# Patient Record
(demographics unavailable — no encounter records)

---

## 2024-11-04 NOTE — HISTORY OF PRESENT ILLNESS
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 Has been having back pain for the last 2-3 weeks. D/c Wellbutrin because her leg was peeling. Asking for HHA extra hours

## 2024-11-04 NOTE — ASSESSMENT
[FreeTextEntry1] :  30 minutes were spent discussing concerns/medical conditions excluding procedure time.

## 2024-11-04 NOTE — REVIEW OF SYSTEMS
[Shortness Of Breath] : no shortness of breath [Wheezing] : no wheezing [Cough] : no cough [Dyspnea on Exertion] : dyspnea on exertion [Negative] : Cardiovascular

## 2024-11-14 NOTE — HISTORY OF PRESENT ILLNESS
[FreeTextEntry1] :    The patient is a  70year P3 with complaints of luci  She reoirts  any urinary leakage She feels complete bladder emptying She voids every _15____ volume. 3nocturia.  Her liquid intake consists of She has a BM q She denies gross hematuria, hx of nephrolithiasis, vaginal bulge or recurrent UTIs Fecal Inc occ Sexually active n Denies abdominal surgical history baiatric sugery on ozempic She has no history of bruising, excessive bleeding, peteciae. reports  ? pmvb drinks water and  diet coke daily  muliple

## 2024-11-14 NOTE — DISCUSSION/SUMMARY
[Reviewed Clinical Lab Test(s)] : Results of clinical tests were reviewed. [Discuss Alternatives/Risks/Benefits w/Patient] : All alternatives, risks, and benefits were discussed with the patient/family and all questions were answered.  Patient expressed good understanding and appreciates the importance of follow up as recommended. [Visit Time ___ Minutes] : [unfilled] minutes [Face to Face Time___ Minutes] : with [unfilled] minutes in face to face consultation. [FreeTextEntry1] : follow up urd and sonogram

## 2024-11-14 NOTE — ASSESSMENT
[FreeTextEntry1] : 70p3  uui  wearing pullups We discussed possible etiologies of her symptoms including overactive bladder. I recommend she start behavioral modifications and bladder training. Written instructions on how to perform bladder training were provided.  She will try this for 4-6 weeks. We reviewed medications for overactive bladder.  If she has no significant improvement in her symptoms she will try adding an anticholinergic medication. Risks and side effects reviewed extensively. She will RTO in 10-12 weeks for follow up or sooner if issues arise.  If she has no improvement in symptoms, will proceed with further workup including urodynamic testing and cystoscopy.  rec  urd   PMVB- unclear etiology check  sonogram pelvic and renal bladder

## 2024-11-14 NOTE — OB HISTORY
[Total Preg ___] : : [unfilled] [Full Term ___] : [unfilled] (full-term) [Living ___] : [unfilled] (living) [Vaginal ___] : [unfilled] vaginal delivery(s) [Abnormal Bleeding] : abnormal bleeding [Sexually Active] : is not sexually active [FreeTextEntry1] : son  tat age 24 from colonacancer

## 2024-11-14 NOTE — PROCEDURE
[Straight Catheterization] : insertion of a straight catheter [Urgent Incontinence] : urgent incontinence [Patient] : the patient [Intraurethral 2% Lidocaine Gel ___ (cc)] : Local Anesthesia: [unfilled] cc of 2% Lidocaine Gel was administered intraurethrally  [None] : there were no complications with the catheter insertion [Clear] : clear [Culture] : culture [No Complications] : no complications [Tolerated Well] : the patient tolerated the procedure well [Post procedure instructions and information given] : Post procedure instructions and information were given and reviewed with patient.

## 2024-11-14 NOTE — PHYSICAL EXAM
[Chaperone Present] : A chaperone was present in the examining room during all aspects of the physical examination [[id number]] : A chaperone was present during the pelvic exam. [FreeTextEntry2] : tiffanie [No Acute Distress] : in no acute distress [Well developed] : well developed [Well Nourished] : ~L well nourished [Good Hygeine] : demonstrates good hygeine [Oriented x3] : oriented to person, place, and time [Normal Memory] : ~T memory was ~L unimpaired [Normal Mood/Affect] : mood and affect are normal [Normal Lung Sounds] : the lungs were clear to auscultation [Respirations regular] : ~T respiratory rate was regular [Rate & Rhythm Regular] : ~T heart rate and rhythm were normal [No Edema] : ~T edema was not present [Supple] : ~T the neck demonstrated no ~M decrease in suppleness [Thyroid Normal] : the thyroid ~T showed no abnormalities [Symmetrical] : the neck was ~L symmetrical [Mass (___ Cm)] : no ~M [unfilled] abdominal mass was palpated [Tenderness] : ~T no ~M abdominal tenderness observed [H/Smegaly] : no hepatosplenomegaly [Warm and Dry] : was warm and dry to touch [Turgor Normal] : skin turgor ~T was normal [Rash/Lesion] : no rash or lesion was noted [Normal Gait] : gait was abnormal [No Joint Swelling] : there was swelling of the joints [No Clubbing, Cyanosis] : no clubbing or cyanosis of the fingernails [Normal Strength/Tone] : muscle strength and tone was not normal [Normal Appearance] : general appearance was normal [Uterine Adnexae] : were not tender and not enlarged [Post Void Residual ____ml] : post void residual was [unfilled] ml [Normal] : was normal [None] : no [de-identified] : uses walker

## 2024-11-14 NOTE — REASON FOR VISIT
[Initial Visit ___] : an initial visit for [unfilled] [Urinary Incontinence] : urinary incontinence [Urine Frequency] : urine frequency [FreeTextEntry2] :        /pmvb v hematuia

## 2025-01-13 NOTE — HISTORY OF PRESENT ILLNESS
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 Today c/o dental pain and swelling of her right cheek for 2 days, pt has poor dentition and has been unable to schedule appt with dentist. Also refer SOB on exertion, feels like her usual asthma exacerbation

## 2025-01-13 NOTE — PHYSICAL EXAM
[No Respiratory Distress] : no respiratory distress  [No Accessory Muscle Use] : no accessory muscle use [Normal] : normal rate, regular rhythm, normal S1 and S2 and no murmur heard [de-identified] : b/l wheezing

## 2025-01-13 NOTE — HEALTH RISK ASSESSMENT
[Little interest or pleasure doing things] : 1) Little interest or pleasure doing things [Feeling down, depressed, or hopeless] : 2) Feeling down, depressed, or hopeless [1] : 2) Feeling down, depressed, or hopeless for several days (1) [PHQ-2 Positive] : PHQ-2 Positive [de-identified] : I spend 5 min performing a depression screening on this patient [[id number]] : 2 [Former] : Former

## 2025-01-13 NOTE — REVIEW OF SYSTEMS
[Shortness Of Breath] : shortness of breath [Wheezing] : wheezing [Cough] : no cough [Dyspnea on Exertion] : dyspnea on exertion [Negative] : Cardiovascular

## 2025-01-23 NOTE — REASON FOR VISIT
[Follow-Up] : a follow-up visit [Asthma] : asthma [COPD] : COPD [Pulmonary Nodules] : pulmonary nodules

## 2025-01-28 NOTE — ASSESSMENT
[FreeTextEntry1] : 1-28-25  It was a pleasure to see Karyna during our follow-up visit today. Her respiratory issues are summarized:  1. Asthma/COPD overlap (ACO) Over 40 years of age, demonstrates moderate-severe airflow obstruction with FEV1/FVC of 50% of predicted. She has had asthma since 5 years old. With inhaled bronchodilator there is partial reversal of airflow obstruction 0.86 L -> 1.05 L, 41% -> 51%. She has been exposed to cigarette smoke. She has a 44 PY history of smoking. We have  maintained on Singulair, Incruse,  Brovana and budesonide. She continues to take her rescue inhaler sporadically. We will continue to monitor FEV1 on an annual basis to ensure no downward trend. She has abstained from cigarettes for over 10 years. She continues to use marijuana.   PFTs today (1/28/25) shows mild obstructive lung defect.  Over the past year, there is significant improvement in small airways, [id number]%, 0.20 -> 0.47 There is less air trapping, RV/TLC 64% -> 46% DLCO is 10.55 (54% of predicted). This is stable. Her walk distance improved by > 40 meters and saturation is significantly better. She walked 310 meters, SpO2  -> 95%.  She has lost over approximately 130 pounds, which is the most likely reason for improvement in PFTs and 6MWT  Recently, there have been several studies done showing that people who have an allergic asthma component of their asthma-COPD syndrome seem to have better quality of life and less exacerbations if they are put on biologic. ALENO study: using mepolizumab in a dose of 300mg subq every 4 weeks showed significant improvement in aforementioned endpoints. Two other studies FLAKOUS and NOTUS using dupilumab also showed improvements. We will perform testing for TH2 Asthma phenotype to determine if she is a suitable candidate.   Plan: - continue with triple inhaler therapy (Incruse (1 puff daily), Symbicort (advised to take 2 puffs BID), albuterol PRN) - obtain IgE, allergy panel, CBC with differential for eosinophil count.   2. Marijuana smoking We have gone over with Karyna the objective evidence of adverse effects of marijuana 1 higher mortality according to [place] study (MVA, CV, pulmonary Side Effects) 2, cough, wheeze and dyspnea 3. exacerbation of asthma 4. inconsistent results but possible cancer. Cancers: squamous cell of head and neck. In men testicular cancer 5. cardiac toxicity (ischemia, MI, arrhythmias) 6. Strokes 7. Cognitive dysfunction.   She continues to smoke marijuana (non-prescription).  3. Pulmonary nodules There were micro nodules seen on 8/23/21. Chest CT scan done 8/17/2022 demonstrates very tiny micro nodules none of which are new.  Plan: She is overdue for LCS LDCT Shared decision making done today  4. ELLA - she has lost over 120 lbs. We will obtain repeat sleep study off CPAP to determine if she still requires it.   5. PH  It is unlikely that she has pulmonary hypertension. On her last echo 2/2024 there was insufficient TR to calculate PASP  6. Chronic pain  Karyna's main physical complaint is her chronic pain. She has a referral to see a new pain specialist and also plans to start seeing a psychotherapist. She is not a candidate for surgery. She uses gabapentin.   Plan: - She should continue to follow closely with pain management specialist.   7. Lower extremity edema with elevated d-dimer Karyna has R > L lower extremity edema with venous stasis changes of the right leg. Her d-dimer was elevated to 364 on 8/16. Bilateral lower extremity venous doppler to r/o DVT was negative on 8/24  Return to clinic in 6 months.

## 2025-01-28 NOTE — ADDENDUM
[FreeTextEntry1] :  I, Dr. Damaso Mcmullen, personally performed the evaluation and management (E/M) services for this established patient who presents today with no new symptoms     I personally performed the services described in the documentation, reviewed the documentation recorded by the scribe in my presence and it accurately and completely records my words and actions      Ms. Korin Almazan acted as a scribe in writing the note that was dictated by me.     I spent  a total of 40 min minutes evaluating the patient today. This includes spending 10 min on reviewing the patient's clinical history, serial CT scans and PFT's before the patients visit     The remaining time was spent with the patient in showing prior CT scan and comparing them with the current images.     This time does not include performance of any procedures such as PFT

## 2025-01-28 NOTE — DISCUSSION/SUMMARY
[FreeTextEntry1] : ATTENDING'S SUMMARY: 1-28-25 No pallor, no icterus.  No cervical adenopathy, no supraclavicular adenopathy. No JVD in the sitting position No cyanosis, no clubbing, no articular manifestations, no Raynaud's, good radial pulses, no thickening on dorsum of skin, no subcutaneous nodules.  High pitched pan expiratory wheezing noted bilaterally on exhalation.  Cardiovascular sounds distant, normal S1, S2, no murmurs, rubs or gallops.  1+ pitting edema, equal bilaterally.

## 2025-01-28 NOTE — PROCEDURE
[Thoracic Ultrasound] : Thoracic Ultrasound [A line] : A line: Yes [Lung sliding] : Lung sliding: Yes [Pleural Effusion] : Pleural Effusion: No [Consolidation] : Consolidation: No [de-identified] : abnormal physical exam finding [FreeTextEntry2] : No signs of pulmonary edema or pleural effusions. Normal lung POCUS examination. [FreeTextEntry1] : PFT 25 FVC: 1.83 L (71%)--> 1.86 L (72%)  FEV1: 1.14 L (57%)--> 1.14 L (57%)  FEV1/FVC: 62%--> 62% LMD22-08%: 0.47 L/s (26%)--> 0.40 L/s (22%) TLC: 3.38 L (76%) RV/T% DLCO: 10.55 (54%) NIOX 6 ppb  6MWT 25: 310 meters, SpO2 97% -> 95%  PFT 24  FVC: 1.56 L (65%)--> 1.79 L (75%)   FEV1: 0.87 L (46%)--> 1.03 L (55%)   FEV1/FVC: 56%--> 58% TRC33-52%: 0.20 L/s (12%)--> 0.25 L/s (15%) T.73 L (113%) RV/T% DLCO: 14.87 (91%)  6MWT 24: 257 METERS, SPo2 94% -> 90%  Patient had an annual Low Dose CT for Lung Cancer screening on 23 Results of scan: Impression: Since 2022, there are again a few micronodules bilaterally. Lung-RADS category: 2  22 LDCT CHEST Report pending Images reviewed by Dr. Mcmullen personally Mild dilatation of airways with thickening of the walls discoid atelectasis on lingular segment significant Coronary Artery calcification No visible nodules. Previous nodules in RUL not seen No lung parenchymal abnormalities ***** 22 ECHO Normal LV and RV size and funtion No significant valvular disease insufficient TR to calculate PASP ***** PFT 22 FVC: 1.48 L (54%)--> 2.17 L (81%)   FEV1: 0.86 L (41%)--> 1.05 L (51%)   FEV1/FVC: 59%--> 48% GOV84-66%: 0.29 L/s (15%)--> 0.25 L/s (13%) TLC: 3.61 L (81%) RV/T% DLCO: 9.97 (50%)  EX[facility]: 2020  Lungs and airways: Image numbers for description of nodule location refer to thin section axial series number 4.  The previously seen nodular opacity in the right lower lobe has resolved, indicating that it was probably caused by atelectasis. There is a small amount of dependent atelectasis in the left lower lobe. No change in a few bilateral micronodules, such as in the apical segment of the right upper lobe on image 63, in the anterior segment of the right upper lobe on image 94, and in the superior segment of the left lower lobe on image 156. A benign-appearing calcified nodule nodule in the anterobasal segment of the right lower lobe on image 176 unchanged. There is again mild centrilobular emphysema. Bronchial wall thickening again present bilaterally.  Pleura: The pleural spaces are clear.  Base of neck, mediastinum and heart: No change 0.6 cm calcified nodule right lobe of thyroid. No mediastinal, hilar or axillary lymphadenopathy is seen.  The heart and pericardium are within normal limits.  Vessels/Coronary artery calcification: Small calcified plaque aorta. Moderate coronary artery calcification.  Soft tissues: Left chest wall implant has pacemaker leads within right atrium and right ventricle.  Abdomen: Small hiatal hernia. Again post sleeve gastrectomy.  Bones: Degenerative changes of spine.  Impression: Since 3/5/2019, there has been no change in a few micronodules bilaterally. Probable atelectasis in the right lower lobe has resolved.  Lung-RADS category: 2 - Benign appearance or behavior. Nodules with very low likelihood of becoming a clinically active cancer due to size or lack of growth. Probability of malignancy < 1%.  Recommendation: Continue annual screening with LDCT in 12 months.   PFT and 6MWT 19: FVC: 1.54 L (59%) --> 1.62 L (62%) FEV1: 0.94 L (45%) --> 1.06 L/s (51%) FEV1/FVC: 61% --> 66% EJA60-17%: 0.40 L/s (18%) --> 0.51 L/s (23%) TLC: 3.45 L (81%) RV/T% DLCO: 15.3 (65%) 6MWT: 366 meters, SpO2 start (on RA): 94% --> SpO2 end (on RA): 94% Moderate obstructive lung defect. FEV1 increased by 13% post-inhaled bronchodilator. CUR47-64% increased by 27% post-inhaled bronchodilator. MIld restrictive lung defect. Mildly decreased diffusion capacity. Improved walk distance with no desaturation.   Spirometry and NIOX 19: pt declined  EXAM: CT LDCT LUNG CA SHORT TERM F U PROCEDURE DATE: 2019  Lungs and airways: Image numbers for description of nodule location refer to thin section axial series number 4.  The nodular opacity in the posterior basal segment of the right lower lobe on image 265 has decreased in size from 19 x 16 mm (13 mm average) to 13 x 9 mm (11 mm average). No change solid micronodule right apex, image 106. No new lung nodule.  Pleura: The pleural spaces are clear.  Base of neck, mediastinum and heart: The thyroid gland is normal. No mediastinal, hilar or axillary lymphadenopathy is seen. The heart is enlarged. No pericardial effusion. Small calcified plaque aorta.  Coronary artery calcification: Moderate.  Soft tissues: There is a left chest wall implant with pacemaker leads in the right atrium and right ventricle.  Abdomen: Again post sleeve gastrectomy. Small hiatal hernia.  Bones: Degenerative changes of spine.  Impression: Since 2018, the nodular opacity in the right lower lobe has decreased in size. It could be an area of atelectasis.  Lung-RADS category: 2 - Benign appearance or behavior. Nodules with very low likelihood of becoming a clinically active cancer due to size or lack of growth. Probability of malignancy < 1%.  Recommendation: Continue annual screening with LDCT in 12 months.   PFT 19: Pt declined PFT testing today.  EXAM: CT LDCT LUNG CA SCRN ANNUAL PROCEDURE DATE: 2018  INTERPRETATION: CT scan of the chest without intravenous contrast, using low-dose lung cancer screening protocol  History: 64-year-old former smoker with a 44 pack year history of smoking.  Comparison: 2017.  Findings:  Lungs and airways: Image numbers for description of nodule location refer to thin section axial series number 4. There is new focus of tree-in-bud micronodular opacities seen within the posterior segment of the right upper lobe (series 4, image 111). There has been interval resolution of linear atelectasis within the medial basal segment of the right middle lobe. Interval resolution of linear atelectasis within the right lower lobe. New tree-in-bud micronodular opacities are seen within the posterior basal segment of the right lower lobe (series 4, image 187 Linear atelectasis over the medial basal segment of the right lower lobe (image 4, 189.   NODULE 1:  Location: Posterior basal segment of the right lower lobe, Image #(axial image 209, sagittal image 64, and coronal image 51)  Size: 12.5 mm  Contains Fat: No  Calcification: None  Morphology: Round  Margins: Smooth  Change from prior: New   Trachea and central bronchi patent.  Pleura: New probable nodular pleural thickening versus atelectasis as described above involving the medial basal segments of right lower lobe (axial image 189 and sagittal image 77).  Base of neck, mediastinum and heart: The thyroid gland is normal. No mediastinal, hilar or axillary lymphadenopathy is seen. There is cardiomegaly. Pericardium are within normal limits. There is a left-sided  permanent pacemaker. The pacer wires are again noted in the region of the right atrium and right ventricle.  Coronary artery calcification: Mild  Soft tissues: Normal.  Abdomen: There is streak artifact however grossly unchanged and unremarkable unenhanced appearance. Probable left-sided parapelvic cysts sleeve gastrectomy.  Bones: Severe thoracolumbar scoliosis.   Impression:  1. Exacerbation in small airway disease with new tree-in-bud centrilobular micronodules within the right upper and right lower lobes.  2. New nodular pleural thickening over the medial basal segment of the right lower lobe.  3. In addition there is a new nodular opacity which may represent an area of rounded atelectasis versus a new nodule measuring 12.5 mm involving the posterior basal segment the right lower lobe.   Lung-RADS category:   4B - Suspicious findings for which additional diagnostic testing and/or  tissue sampling is recommended. Probability of malignancy > 15%.   Recommendation:  4B - chest CT with or without contrast, PET/CT and/or tissue sampling  depending on the probability of malignancy and comorbidities. PET/CT may be  used when there is a = 8 mm solid component.   [place] and 6MWT 18: FVC: 1.66 L (69%) --> 1.75 L (72%) FEV1: 1.18 L (60%) --> 1.17 L (60%) FEV1/FVC: 71% --> 67% SAX45-70%: 0.68 L/s (31%) --> 0.68 L/s (31%) 6MWT: 220 meters, SpO2 start: 94% --> SpO2 end: 94% Cannot r/o mild restrictive defect.  No significant expiratory airflow obstruction. No bronchodilator response. Reduced walk distance with no desaturation;  pt feels she may have been able to walk faster during the test.  [place] and DLCO 18: FVC: 1.44 L (51%) --> 1.74 L (62%) FEV1: 1.00 L (46%) --> 1.13 L (52%) FEV1/FVC: 70% --> 65% JHW82-42%: 0.60 L/s (28%) --> 0.48 L/s (22%) DLCO: 9.9 (41%) Severe obstruction with significant response to inhaled bronchodilator (in FVC). Moderatley decreased diffusion capacity.   [place] and DLCO, 6MWT 18: FVC: 1.97 L (70%) --> 2.03 L (73%) FEV1: 1.30 L (60%) --> 1.43 L (65%) FEV1/FVC: 66% --> 70% EVA13-91%: 0.63 L/s (29%) --> 0.80 L/s (37%) DLCO: 17.2 (72%) 6MWT: 288m, SpO2 start: 94%, SpO2 end: 95% Mildly reduced FVC. Mild obstructive defect. Mildly reduced diffusion capacity.  Reduced walk distance. No desaturation.  BAL 17: AFB culture: No AFB at 6 weeks Fungal culture: No fungus at 4 weeks C&S: Normal respiratory denise Fungitell: <45 Galactomannan: <0.500 Fluid segmented granulocytes: 94% Lymphocytes: 1% Monocytes: 4% Eosinophil: 1%  Spirometry 17: FVC: 1.67 L (60%) --> 1.63 L (58%) FEV1: 1.03 L (47%) --> 1.05 L (48%) FEV1/FVC: 62% --> 64% DGX86-37%: 0.41 L/s (19%) --> 0.41 L/s (19%) Severe obstruction with no significant response to inhaled bronchodilator.   NIOX 17: 16 ppb Normal exhaled nitric oxide level.   EXAM:  CT NECK SOFT TISSUE, 2017 COMPARISON: CT angiogram of the neck 2013. FINDINGS: The aerodigestive tract is patent, but suboptimally assessed with intravenous contrast. There is no masslike asymmetry or fluid collection. At the level of the thoracic inlet just below the thyroid gland there is a slight contour deformity of the right posterior lateral tracheal wall. There is expected flattening and slight inward convexity of the posterior tracheal wall during this scan done at quiet respiration. Volume rendered 3D images are generated by the attending radiologist using a separate workstation.  There is no significant luminal compromise. The included lung apices appear grossly clear, however please see dedicated report from the contemporaneous chest CT for complete evaluation. A left chest wall cardiac pacer device noted. Review of the [place] stations demonstrates scattered subcentimeter nodes which do not meet imaging criteria for pathologic lymphadenopathy. Surgical clips identified from partial left superficial parotidectomy. The right parotid gland and both submandibular glands appear unremarkable on this noncontrast study. The osseous structures show cervical spondylosis at C4-5 level. The visualized intracranial and intraorbital compartments fail to demonstrate an acute abnormality. There is a large nasal septal defect. IMPRESSION: CT neck shows a patent airway. There is minimal contour deformity of the right posterolateral upper trachea. This is present on this exam done under quiet respiration. For more complete evaluation of the chest, please see dedicated report from the concurrent chest CT.  EXAM:  CT LDCT LUNG CA SCRN BASELINE *** ADDENDUM 2017  *** Addendum: 1. 4 mm noncalcified nodule within the lateral aspect of the apical segment of the right upper lobe (image 72) on further inspection was present on examination dated 10/17/2016, and 2016 and essentially unchanged allowing for differences in imaging technique. 2. Previously described 8 mm nodule within the posterior basal segment of the left lower lobe on multiplanar reformations has a more linear configuration consistent with a new focus of probable atelectasis as demonstrated in the posterior basal segment of the right lower lobe (image 259, and coronal image 125, series [id number], and sagittal image 17, series [id number]). Revised Lung-Rads Category: 2  Recommendation: Continue annual screening with LDCT in 12 months. *** END OF ADDENDUM 2017  ***  PROCEDURE DATE:  2017 INTERPRETATION:  CT scan of the chest without intravenous contrast, using low-dose lung cancer screening protocol History: 44 year smoking history. Quit 6 years ago. Lung cancer screening. Comparison: CT chest from 10/17/2016. Findings: Lungs and airways: (Series 4) Minimal biapical paraseptal and mild upper lung zone centrilobular emphysema. There is increasing mild small airway inflammation characterized by scattered micronodules in a centrilobular tree-in-bud pattern (image 113, and 221) in the right upper, and right lower lobes respectively. There is increasing mild large airway inflammation characterized by bronchial wall thickening and bronchiectasis (images 94, 128 and 185). There are multiple new more discrete solid micronodules identified bilaterally thought to represent areas of mucous plugging largest in the apical segment of the right upper lobe measuring 4 mm (image 72) and in the posterior basal segment of the left lower lobe there is a new subpleural nodular and linear opacity measuring 8 mm (image 59) There are new linear opacities identified bilaterally in the posterior basal segment of the right lower lobe (image 245) as well as in the inferior medial aspect of the medial segment of the right middle lobe (image 214) thought to represent new areas of atelectasis Impression: 1.  Lung Rad category 4A:  New 7 mm subpleural nodule in posterior basal segment of left lower lobe with an adjacent linear component. This may represent a new area of atelectasis. Continue annual screening with low dose contrast CT in 12 months. 2.  Lung Rad category 3:  New 4 mm solid nodule within the apical segment of the right upper lobe. 3.  New mild small and large airway inflammation as described above. 4.  Minimal paraseptal and mild upper lung zone centrilobular emphysema. 5.  Mild left-sided coronary artery calcification.  Lung-RADS category: 3 - Probably benign finding - short term follow up suggested; includes nodules with a low likelihood of becoming a clinically active cancer. Probability of malignancy 1-2%.  4A - Suspicious findings for which additional diagnostic testing and/or tissue sampling is recommended. Probability of malignancy 5-15%.  Recommendation: 3 - 6 month LDCT. 4A - 3 month LDCT; PET/CT may be used when there is a = 8 mm solid component  NIOX 10/24/17: 10ppb Normal exhaled nitric oxide level.  Spirometry 10/24/17: FVC: 1.56 L (56%) FEV1: 1.04 L (48%) FEV1/FVC: 66% KPD14-90%: 0.55 L/s (26%) Moderate airway obstruction.  Cannot rule out restriction.  CXR 10/24/17: No obvious parenchymal lung opacities. No pleural effusion.  Maximal height of right hemidiaphragm located at 1/3 distance from lateral edge.   Spirometry and DLCO 17: FVC: 1.62L (57%) FEV1: 0.89L (40%) FEV1/FVC: 55% PYX37-30%: 0.31L/s (14%) DLCO: 11.1 (48%) Moderate obstructive airways disease. Moderately reduced DLCO.   6MWT 17:  Distance: 288 m with walker, SpO2 start: 95%, SpO2 end: 88% Reduced walk distance with significant desaturation down to SpO2 88%.   Spirometry and DLCO 17: FVC: 1.55 L (55% pred) FEV1: 0.88 L (40% pred) FEV1/FVC: 57% SYM43-18%: 0.32 L/s (14% pred) DLCO: 11.7 (51% pred) Moderate obstructive airways disease. Moderately reduced DLCO.   PFT 3/28/17: FVC: 1.68 L (59% pred) --> 1.70 L (60% pred) FEV1: 1.08 L (49% pred) --> 1.11 L (50% pred) FEV1/FVC: 64% --> 65% UOA57-25%: 0.47 L/s (21% pred) --> 0.49 L/s (22% pred) TLC: 3.46 L (81% pred) RV/T% DLCO: 12.3 (54% pred) Mild obstruction.  Lung volumes normal.  Air trapping present.  Severely reduced DLCO.   17: FVC: 1.61 L (60% pred) FEV1: 1.03 (48% pred) FEV1/FVC: 64% OFJ58-28%: 0.43 L/s (19% pred) Moderate obstructive defect, notably of small airways.  FVC and FEV1 reduced, cannot rule out restriction.    EXAM:  CT CHEST    10/17/2016 Evaluation of the chest demonstrates the visualized thyroid gland is normal in appearance. The heart is normal size. Coronary arterial calcification. No evidence of small airways disease. No evidence of  clustered tree-in-bud branching nodularity. There is a calcified nodule of the right lobe of the thyroid gland measuring 5 mm. No pleural or pericardial effusion. For thoracic inlet, axillary, mediastinal or hilar  adenopathy. Evaluation of the lung parenchyma is negative for pulmonary consolidation or mass. No endobronchial lesion. Minimal biapical centrilobular emphysematous change. Evaluation of the upper abdomen demonstrate a small hiatal hernia and post surgical changes from gastric bypass. Evaluation of the osseous structures is unremarkable. IMPRESSION: No suspicious pulmonary finding.No significant bronchiolitis.  9/15/16 Spirometry: FEV1 0.97L (45%), FVC 1.46L (54%), FEV1/FVC 66% NIOX = 6 CXR - Clear, no infiltrates  6 minute walk test performed 3/1/16: The patient covered a distance of 215 m (significantly improved from last time). The baseline SpO2 was 93%. At the end of exercise it was 91% which is not significantly different. Last while the distance was reduced to approximately 50% of predicted for this lady, there was no desaturation  3/1/16: The forced vital capacity was 1.38 L (48% predicted). The FEV1 was 0.91 L (41% predicted ). The FEV1/FVC was 66%. This shows a very significant reduction as compared to the previous tests.  6MWT 16: Distance: 172m, SpO2 start: 92.7%, SpO2 end: 94% Significantly reduced walk distance.  No significant change in SpO2 with exercise.  PFT 17: FVC: 1.69 L (59% pred) <-- from 1.35 L (47% pred) in 10/15 FEV1: 1.22 L (55% pred) <-- from 0.94 L (42% pred) in 10/15 FEV1/FVC: 72%<-- from 70% in 10/15 QUG38-25%: 0.81 L (36% pred) <-- from 0.61 L/s (27% pred) in 10/15 TLC: 3.40 L (79% pred) DLCO: 14.1 (60% pred) Mixed defect.  Mild improvement in flow after bronchodilator but not c/w positive response. Moderate diffusion capacity decrease.  CT of the CHEST  DATE:  2015 LUNGS: No pleural effusions are seen. There are multiple areas of centrilobular nodules with a branching configuration compatible with a productive bronchiolitis. More linear opacities are noted which may represent areas of mucous plugging within the lateral segment of the right middle lobe as well as the lateral basal segment of the right lower lobe. There is bronchial wall thickening to suggest large airway inflammation. MEDIASTINUM: The heart is normal in size.  No pericardial effusion is seen. Pulmonary arteries and thoracic aorta within normal limits. There is mild to moderate calcification of the left coronary artery. Heart size is within normal limits. No pericardial effusion. No axillary or supraclavicular lymphadenopathy. UPPER ABDOMEN, SOFT TISSUES AND BONES: Limited evaluation of the upper abdomen demonstrates evidence of chain sutures along the greater curvature of the stomach consistent with gastric sleeve procedure. Evaluation of the osseous structures demonstrates degenerative changes. IMPRESSION: Evidence of moderate to severe small airway inflammation characterized by productive bronchiolitis with a lesser involvement of the large airways. This is most pronounced in the distribution of the right middle and right lower lobes. No significant air-trapping is identified. Interval follow-up imaging after therapeutic administration may be of value in approximately 3 months. No suspicious parenchymal mass, lobar consolidation or pleural collections.Consideration, in view of the patient's history of participation in lung cancer screening is suggested.  CPAP polysomnography report from 12 reviewed today -CPAP @ 6.0cm/H2O during sleep with 2L of O2 was suggested  ECHO on 10/12/15 reviewed today -normal right atrium, normal right ventricular size and function, normal tricuspid valve, minimal tricuspid regurgitation -estimated right atrial pressure = 8mm Hg -estimated right ventricular systolic pressure = 24 mm Hg  Spirometry 10/13/15 - c/w severe obstructive pattern with likely small airway involvment FVC: 1.35 (47%) FEV1: 0.94 (42%) FEV1/FVC: 70% DAP19-69: 0.61 (27%)  NiOx 10/13/15 normal= 7  CXR 10/15/15= clear  PS AHI: 51/hr - Lowerst Sat: 75% Patient notes compliance with current CPAP at level 2 and O2 overnight at 2L NC

## 2025-01-28 NOTE — REVIEW OF SYSTEMS
[Fatigue] : fatigue [Recent Wt Gain (___ Lbs)] : ~T recent [unfilled] lb weight gain [Cough] : cough [Dyspnea] : dyspnea [SOB on Exertion] : sob on exertion [Back Pain] : back pain [Chronic Pain] : chronic pain [Depression] : depression [Anxiety] : anxiety [Panic Attacks] : panic attacks [Obesity] : obesity [Negative] : Neurologic [Fever] : no fever [Chills] : no chills

## 2025-01-28 NOTE — PHYSICAL EXAM
[No Acute Distress] : no acute distress [No Resp Distress] : no resp distress [Clear to Auscultation Bilaterally] : clear to auscultation bilaterally [No Abnormalities] : no abnormalities [Benign] : benign [No Clubbing] : no clubbing [No Cyanosis] : no cyanosis [No Edema] : no edema [FROM] : FROM [Normal Color/ Pigmentation] : normal color/ pigmentation [No Focal Deficits] : no focal deficits [Oriented x3] : oriented x3 [Normal Affect] : normal affect [TextBox_2] : No pallor, no icterus [TextBox_11] : no JVD, no hepatojugular reflux  [TextBox_44] : no cervical adenopathy, no supraclavicular adenopathy [TextBox_54] : normal s1/s2, no murmurs, rubs or gallops [TextBox_68] : High pitched pan expiratory wheezing noted bilaterally on exhalation.   [TextBox_105] : 1+ pitting edema, equal bilaterally.

## 2025-01-28 NOTE — HISTORY OF PRESENT ILLNESS
[Former] : former [TextBox_4] : 71 yo F PMH sciatica with 2 herniated discs, 3 brain aneurysms, minimally mobile has a scooter but tries to walk more, presents for F/U of [facility], active marijuana use, GERD, part of the lung cancer screening program, noncaseating granulomas found on subcarinal lymph node without evidence of MTB and CVD serologies negative. Continues on Brovana BID, budesonide BID, Incruse, and Singulair. DLCO noted to have decreased significantly, increased budesonide to BID ************ 1-28-25 She has lost 130 pounds, 278 to 146 pounds she was able to walk 30 blocks the other day she used to have to stop after 3-4 blocks  7-23-24: She was given steroids by primary care doctor 2 weeks ago, about 5 days duration, due to wheezing although she says she felt well from a breathing standpoint wiht no increased phlegm or dyspnea. Says her breathing has been "pretty good". Having daily cough, occasionally with white phlegm. Still losing weight, now 155 (previously 278lbs). Using incruse elipta daily, denies using brovana. Continues to use marijuana daily.   2-20-24: Patient stated that she was placed on prednisone by her new PCP about a week due to wheezing and productive cough. Patient states that her productive cough is chronic and so is her wheezing. She is not sure if the medications were really indicated and if she is improving. Sedentary most of the day. Mentions significant weight loss. Lost 70lbs with ozempic and decreased appetite. Stopped smoking cigarettes 10 years ago. Still smokes marijuana for her joint pain.  5-23-23: She lost 35 pounds, on ozempic She is complaining of worsening asthma symptoms  11-22-22: She is short of breath, worse after she takes a bath She reports asbestos and fungus were found in her bathroom. She is requiring nebulizers 2-3 times a day Phlegm is green She had a stress test on 8/31. When she finished the stress test, she was experiencing chest pain and went to the ER. She was diagnosed with Takotsubo cardiomyopathy. [TextBox_11] : 1 [TextBox_13] : 44 [YearQuit] : 2011

## 2025-01-28 NOTE — PROCEDURE
[Thoracic Ultrasound] : Thoracic Ultrasound [A line] : A line: Yes [Lung sliding] : Lung sliding: Yes [Pleural Effusion] : Pleural Effusion: No [Consolidation] : Consolidation: No [de-identified] : abnormal physical exam finding [FreeTextEntry2] : No signs of pulmonary edema or pleural effusions. Normal lung POCUS examination. [FreeTextEntry1] : PFT 25 FVC: 1.83 L (71%)--> 1.86 L (72%)  FEV1: 1.14 L (57%)--> 1.14 L (57%)  FEV1/FVC: 62%--> 62% RPD11-28%: 0.47 L/s (26%)--> 0.40 L/s (22%) TLC: 3.38 L (76%) RV/T% DLCO: 10.55 (54%) NIOX 6 ppb  6MWT 25: 310 meters, SpO2 97% -> 95%  PFT 24  FVC: 1.56 L (65%)--> 1.79 L (75%)   FEV1: 0.87 L (46%)--> 1.03 L (55%)   FEV1/FVC: 56%--> 58% EAZ32-94%: 0.20 L/s (12%)--> 0.25 L/s (15%) T.73 L (113%) RV/T% DLCO: 14.87 (91%)  6MWT 24: 257 METERS, SPo2 94% -> 90%  Patient had an annual Low Dose CT for Lung Cancer screening on 23 Results of scan: Impression: Since 2022, there are again a few micronodules bilaterally. Lung-RADS category: 2  22 LDCT CHEST Report pending Images reviewed by Dr. Mcmullen personally Mild dilatation of airways with thickening of the walls discoid atelectasis on lingular segment significant Coronary Artery calcification No visible nodules. Previous nodules in RUL not seen No lung parenchymal abnormalities ***** 22 ECHO Normal LV and RV size and funtion No significant valvular disease insufficient TR to calculate PASP ***** PFT 22 FVC: 1.48 L (54%)--> 2.17 L (81%)   FEV1: 0.86 L (41%)--> 1.05 L (51%)   FEV1/FVC: 59%--> 48% TIX27-43%: 0.29 L/s (15%)--> 0.25 L/s (13%) TLC: 3.61 L (81%) RV/T% DLCO: 9.97 (50%)  [facility]: 2020  Lungs and airways: Image numbers for description of nodule location refer to thin section axial series number 4.  The previously seen nodular opacity in the right lower lobe has resolved, indicating that it was probably caused by atelectasis. There is a small amount of dependent atelectasis in the left lower lobe. No change in a few bilateral micronodules, such as in the apical segment of the right upper lobe on image 63, in the anterior segment of the right upper lobe on image 94, and in the superior segment of the left lower lobe on image 156. A benign-appearing calcified nodule nodule in the anterobasal segment of the right lower lobe on image 176 unchanged. There is again mild centrilobular emphysema. Bronchial wall thickening again present bilaterally.  Pleura: The pleural spaces are clear.  Base of neck, mediastinum and heart: No change 0.6 cm calcified nodule right lobe of thyroid. No mediastinal, hilar or axillary lymphadenopathy is seen.  The heart and pericardium are within normal limits.  Vessels/Coronary artery calcification: Small calcified plaque aorta. Moderate coronary artery calcification.  Soft tissues: Left chest wall implant has pacemaker leads within right atrium and right ventricle.  Abdomen: Small hiatal hernia. Again post sleeve gastrectomy.  Bones: Degenerative changes of spine.  Impression: Since 3/5/2019, there has been no change in a few micronodules bilaterally. Probable atelectasis in the right lower lobe has resolved.  Lung-RADS category: 2 - Benign appearance or behavior. Nodules with very low likelihood of becoming a clinically active cancer due to size or lack of growth. Probability of malignancy < 1%.  Recommendation: Continue annual screening with LDCT in 12 months.   PFT and 6MWT 19: FVC: 1.54 L (59%) --> 1.62 L (62%) FEV1: 0.94 L (45%) --> 1.06 L/s (51%) FEV1/FVC: 61% --> 66% FGW07-30%: 0.40 L/s (18%) --> 0.51 L/s (23%) TLC: 3.45 L (81%) RV/T% DLCO: 15.3 (65%) 6MWT: 366 meters, SpO2 start (on RA): 94% --> SpO2 end (on RA): 94% Moderate obstructive lung defect. FEV1 increased by 13% post-inhaled bronchodilator. RND20-15% increased by 27% post-inhaled bronchodilator. MIld restrictive lung defect. Mildly decreased diffusion capacity. Improved walk distance with no desaturation.   Spirometry and NIOX 19: pt declined  EXAM: CT LDCT LUNG CA SHORT TERM F U PROCEDURE DATE: 2019  Lungs and airways: Image numbers for description of nodule location refer to thin section axial series number 4.  The nodular opacity in the posterior basal segment of the right lower lobe on image 265 has decreased in size from 19 x 16 mm (13 mm average) to 13 x 9 mm (11 mm average). No change solid micronodule right apex, image 106. No new lung nodule.  Pleura: The pleural spaces are clear.  Base of neck, mediastinum and heart: The thyroid gland is normal. No mediastinal, hilar or axillary lymphadenopathy is seen. The heart is enlarged. No pericardial effusion. Small calcified plaque aorta.  Coronary artery calcification: Moderate.  Soft tissues: There is a left chest wall implant with pacemaker leads in the right atrium and right ventricle.  Abdomen: Again post sleeve gastrectomy. Small hiatal hernia.  Bones: Degenerative changes of spine.  Impression: Since 2018, the nodular opacity in the right lower lobe has decreased in size. It could be an area of atelectasis.  Lung-RADS category: 2 - Benign appearance or behavior. Nodules with very low likelihood of becoming a clinically active cancer due to size or lack of growth. Probability of malignancy < 1%.  Recommendation: Continue annual screening with LDCT in 12 months.   PFT 19: Pt declined PFT testing today.  EXAM: CT LDCT LUNG CA SCRN ANNUAL PROCEDURE DATE: 2018  INTERPRETATION: CT scan of the chest without intravenous contrast, using low-dose lung cancer screening protocol  History: 64-year-old former smoker with a 44 pack year history of smoking.  Comparison: 2017.  Findings:  Lungs and airways: Image numbers for description of nodule location refer to thin section axial series number 4. There is new focus of tree-in-bud micronodular opacities seen within the posterior segment of the right upper lobe (series 4, image 111). There has been interval resolution of linear atelectasis within the medial basal segment of the right middle lobe. Interval resolution of linear atelectasis within the right lower lobe. New tree-in-bud micronodular opacities are seen within the posterior basal segment of the right lower lobe (series 4, image 187 Linear atelectasis over the medial basal segment of the right lower lobe (image 4, 189.   NODULE 1:  Location: Posterior basal segment of the right lower lobe, Image #(axial image 209, sagittal image 64, and coronal image 51)  Size: 12.5 mm  Contains Fat: No  Calcification: None  Morphology: Round  Margins: Smooth  Change from prior: New   Trachea and central bronchi patent.  Pleura: New probable nodular pleural thickening versus atelectasis as described above involving the medial basal segments of right lower lobe (axial image 189 and sagittal image 77).  Base of neck, mediastinum and heart: The thyroid gland is normal. No mediastinal, hilar or axillary lymphadenopathy is seen. There is cardiomegaly. Pericardium are within normal limits. There is a left-sided  permanent pacemaker. The pacer wires are again noted in the region of the right atrium and right ventricle.  Coronary artery calcification: Mild  Soft tissues: Normal.  Abdomen: There is streak artifact however grossly unchanged and unremarkable unenhanced appearance. Probable left-sided parapelvic cysts sleeve gastrectomy.  Bones: Severe thoracolumbar scoliosis.   Impression:  1. Exacerbation in small airway disease with new tree-in-bud centrilobular micronodules within the right upper and right lower lobes.  2. New nodular pleural thickening over the medial basal segment of the right lower lobe.  3. In addition there is a new nodular opacity which may represent an area of rounded atelectasis versus a new nodule measuring 12.5 mm involving the posterior basal segment the right lower lobe.   Lung-RADS category:   4B - Suspicious findings for which additional diagnostic testing and/or  tissue sampling is recommended. Probability of malignancy > 15%.   Recommendation:  4B - chest CT with or without contrast, PET/CT and/or tissue sampling  depending on the probability of malignancy and comorbidities. PET/CT may be  used when there is a = 8 mm solid component.   Vici and 6MWT 18: FVC: 1.66 L (69%) --> 1.75 L (72%) FEV1: 1.18 L (60%) --> 1.17 L (60%) FEV1/FVC: 71% --> 67% FGM34-57%: 0.68 L/s (31%) --> 0.68 L/s (31%) 6MWT: 220 meters, SpO2 start: 94% --> SpO2 end: 94% Cannot r/o mild restrictive defect.  No significant expiratory airflow obstruction. No bronchodilator response. Reduced walk distance with no desaturation;  pt feels she may have been able to walk faster during the test.  Vici and DLCO 18: FVC: 1.44 L (51%) --> 1.74 L (62%) FEV1: 1.00 L (46%) --> 1.13 L (52%) FEV1/FVC: 70% --> 65% AVT36-02%: 0.60 L/s (28%) --> 0.48 L/s (22%) DLCO: 9.9 (41%) Severe obstruction with significant response to inhaled bronchodilator (in FVC). Moderatley decreased diffusion capacity.   Vici and DLCO, 6MWT 18: FVC: 1.97 L (70%) --> 2.03 L (73%) FEV1: 1.30 L (60%) --> 1.43 L (65%) FEV1/FVC: 66% --> 70% PHG82-04%: 0.63 L/s (29%) --> 0.80 L/s (37%) DLCO: 17.2 (72%) 6MWT: 288m, SpO2 start: 94%, SpO2 end: 95% Mildly reduced FVC. Mild obstructive defect. Mildly reduced diffusion capacity.  Reduced walk distance. No desaturation.  BAL 17: AFB culture: No AFB at 6 weeks Fungal culture: No fungus at 4 weeks C&S: Normal respiratory denise Fungitell: <45 Galactomannan: <0.500 Fluid segmented granulocytes: 94% Lymphocytes: 1% Monocytes: 4% Eosinophil: 1%  Spirometry 17: FVC: 1.67 L (60%) --> 1.63 L (58%) FEV1: 1.03 L (47%) --> 1.05 L (48%) FEV1/FVC: 62% --> 64% TPJ55-88%: 0.41 L/s (19%) --> 0.41 L/s (19%) Severe obstruction with no significant response to inhaled bronchodilator.   NIOX 17: 16 ppb Normal exhaled nitric oxide level.   EXAM:  CT NECK SOFT TISSUE, 2017 COMPARISON: CT angiogram of the neck 2013. FINDINGS: The aerodigestive tract is patent, but suboptimally assessed with intravenous contrast. There is no masslike asymmetry or fluid collection. At the level of the thoracic inlet just below the thyroid gland there is a slight contour deformity of the right posterior lateral tracheal wall. There is expected flattening and slight inward convexity of the posterior tracheal wall during this scan done at quiet respiration. Volume rendered 3D images are generated by the attending radiologist using a separate workstation.  There is no significant luminal compromise. The included lung apices appear grossly clear, however please see dedicated report from the contemporaneous chest CT for complete evaluation. A left chest wall cardiac pacer device noted. Review of the guadalupe stations demonstrates scattered subcentimeter nodes which do not meet imaging criteria for pathologic lymphadenopathy. Surgical clips identified from partial left superficial parotidectomy. The right parotid gland and both submandibular glands appear unremarkable on this noncontrast study. The osseous structures show cervical spondylosis at C4-5 level. The visualized intracranial and intraorbital compartments fail to demonstrate an acute abnormality. There is a large nasal septal defect. IMPRESSION: CT neck shows a patent airway. There is minimal contour deformity of the right posterolateral upper trachea. This is present on this exam done under quiet respiration. For more complete evaluation of the chest, please see dedicated report from the concurrent chest CT.  EXAM:  CT LDCT LUNG CA SCRN BASELINE *** ADDENDUM 2017  *** Addendum: 1. 4 mm noncalcified nodule within the lateral aspect of the apical segment of the right upper lobe (image 72) on further inspection was present on examination dated 10/17/2016, and 2016 and essentially unchanged allowing for differences in imaging technique. 2. Previously described 8 mm nodule within the posterior basal segment of the left lower lobe on multiplanar reformations has a more linear configuration consistent with a new focus of probable atelectasis as demonstrated in the posterior basal segment of the right lower lobe (image 259, and coronal image 125, series 22685, and sagittal image 17, series [id number]). Revised Lung-Rads Category: 2  Recommendation: Continue annual screening with LDCT in 12 months. *** END OF ADDENDUM 2017  ***  PROCEDURE DATE:  2017 INTERPRETATION:  CT scan of the chest without intravenous contrast, using low-dose lung cancer screening protocol History: 44 year smoking history. Quit 6 years ago. Lung cancer screening. Comparison: CT chest from 10/17/2016. Findings: Lungs and airways: (Series 4) Minimal biapical paraseptal and mild upper lung zone centrilobular emphysema. There is increasing mild small airway inflammation characterized by scattered micronodules in a centrilobular tree-in-bud pattern (image 113, and 221) in the right upper, and right lower lobes respectively. There is increasing mild large airway inflammation characterized by bronchial wall thickening and bronchiectasis (images 94, 128 and 185). There are multiple new more discrete solid micronodules identified bilaterally thought to represent areas of mucous plugging largest in the apical segment of the right upper lobe measuring 4 mm (image 72) and in the posterior basal segment of the left lower lobe there is a new subpleural nodular and linear opacity measuring 8 mm (image 59) There are new linear opacities identified bilaterally in the posterior basal segment of the right lower lobe (image 245) as well as in the inferior medial aspect of the medial segment of the right middle lobe (image 214) thought to represent new areas of atelectasis Impression: 1.  Lung Rad category 4A:  New 7 mm subpleural nodule in posterior basal segment of left lower lobe with an adjacent linear component. This may represent a new area of atelectasis. Continue annual screening with low dose contrast CT in 12 months. 2.  Lung Rad category 3:  New 4 mm solid nodule within the apical segment of the right upper lobe. 3.  New mild small and large airway inflammation as described above. 4.  Minimal paraseptal and mild upper lung zone centrilobular emphysema. 5.  Mild left-sided coronary artery calcification.  Lung-RADS category: 3 - Probably benign finding - short term follow up suggested; includes nodules with a low likelihood of becoming a clinically active cancer. Probability of malignancy 1-2%.  4A - Suspicious findings for which additional diagnostic testing and/or tissue sampling is recommended. Probability of malignancy 5-15%.  Recommendation: 3 - 6 month LDCT. 4A - 3 month LDCT; PET/CT may be used when there is a = 8 mm solid component  NIOX 10/24/17: 10ppb Normal exhaled nitric oxide level.  Spirometry 10/24/17: FVC: 1.56 L (56%) FEV1: 1.04 L (48%) FEV1/FVC: 66% VWZ97-99%: 0.55 L/s (26%) Moderate airway obstruction.  Cannot rule out restriction.  CXR 10/24/17: No obvious parenchymal lung opacities. No pleural effusion.  Maximal height of right hemidiaphragm located at 1/3 distance from lateral edge.   Spirometry and DLCO 17: FVC: 1.62L (57%) FEV1: 0.89L (40%) FEV1/FVC: 55% DZO79-99%: 0.31L/s (14%) DLCO: 11.1 (48%) Moderate obstructive airways disease. Moderately reduced DLCO.   6MWT 17:  Distance: 288 m with walker, SpO2 start: 95%, SpO2 end: 88% Reduced walk distance with significant desaturation down to SpO2 88%.   Spirometry and DLCO 17: FVC: 1.55 L (55% pred) FEV1: 0.88 L (40% pred) FEV1/FVC: 57% JAI10-08%: 0.32 L/s (14% pred) DLCO: 11.7 (51% pred) Moderate obstructive airways disease. Moderately reduced DLCO.   PFT 3/28/17: FVC: 1.68 L (59% pred) --> 1.70 L (60% pred) FEV1: 1.08 L (49% pred) --> 1.11 L (50% pred) FEV1/FVC: 64% --> 65% YWG81-80%: 0.47 L/s (21% pred) --> 0.49 L/s (22% pred) TLC: 3.46 L (81% pred) RV/T% DLCO: 12.3 (54% pred) Mild obstruction.  Lung volumes normal.  Air trapping present.  Severely reduced DLCO.   17: FVC: 1.61 L (60% pred) FEV1: 1.03 (48% pred) FEV1/FVC: 64% CVG20-55%: 0.43 L/s (19% pred) Moderate obstructive defect, notably of small airways.  FVC and FEV1 reduced, cannot rule out restriction.    EXAM:  CT CHEST    10/17/2016 Evaluation of the chest demonstrates the visualized thyroid gland is normal in appearance. The heart is normal size. Coronary arterial calcification. No evidence of small airways disease. No evidence of  clustered tree-in-bud branching nodularity. There is a calcified nodule of the right lobe of the thyroid gland measuring 5 mm. No pleural or pericardial effusion. For thoracic inlet, axillary, mediastinal or hilar  adenopathy. Evaluation of the lung parenchyma is negative for pulmonary consolidation or mass. No endobronchial lesion. Minimal biapical centrilobular emphysematous change. Evaluation of the upper abdomen demonstrate a small hiatal hernia and post surgical changes from gastric bypass. Evaluation of the osseous structures is unremarkable. IMPRESSION: No suspicious pulmonary finding.No significant bronchiolitis.  9/15/16 Spirometry: FEV1 0.97L (45%), FVC 1.46L (54%), FEV1/FVC 66% NIOX = 6 CXR - Clear, no infiltrates  6 minute walk test performed 3/1/16: The patient covered a distance of 215 m (significantly improved from last time). The baseline SpO2 was 93%. At the end of exercise it was 91% which is not significantly different. Last while the distance was reduced to approximately 50% of predicted for this lady, there was no desaturation  3/1/16: The forced vital capacity was 1.38 L (48% predicted). The FEV1 was 0.91 L (41% predicted ). The FEV1/FVC was 66%. This shows a very significant reduction as compared to the previous tests.  6MWT 16: Distance: 172m, SpO2 start: 92.7%, SpO2 end: 94% Significantly reduced walk distance.  No significant change in SpO2 with exercise.  PFT 17: FVC: 1.69 L (59% pred) <-- from 1.35 L (47% pred) in 10/15 FEV1: 1.22 L (55% pred) <-- from 0.94 L (42% pred) in 10/15 FEV1/FVC: 72%<-- from 70% in 10/15 AFG07-13%: 0.81 L (36% pred) <-- from 0.61 L/s (27% pred) in 10/15 TLC: 3.40 L (79% pred) DLCO: 14.1 (60% pred) Mixed defect.  Mild improvement in flow after bronchodilator but not c/w positive response. Moderate diffusion capacity decrease.  CT of the CHEST  DATE:  2015 LUNGS: No pleural effusions are seen. There are multiple areas of centrilobular nodules with a branching configuration compatible with a productive bronchiolitis. More linear opacities are noted which may represent areas of mucous plugging within the lateral segment of the right middle lobe as well as the lateral basal segment of the right lower lobe. There is bronchial wall thickening to suggest large airway inflammation. MEDIASTINUM: The heart is normal in size.  No pericardial effusion is seen. Pulmonary arteries and thoracic aorta within normal limits. There is mild to moderate calcification of the left coronary artery. Heart size is within normal limits. No pericardial effusion. No axillary or supraclavicular lymphadenopathy. UPPER ABDOMEN, SOFT TISSUES AND BONES: Limited evaluation of the upper abdomen demonstrates evidence of chain sutures along the greater curvature of the stomach consistent with gastric sleeve procedure. Evaluation of the osseous structures demonstrates degenerative changes. IMPRESSION: Evidence of moderate to severe small airway inflammation characterized by productive bronchiolitis with a lesser involvement of the large airways. This is most pronounced in the distribution of the right middle and right lower lobes. No significant air-trapping is identified. Interval follow-up imaging after therapeutic administration may be of value in approximately 3 months. No suspicious parenchymal mass, lobar consolidation or pleural collections.Consideration, in view of the patient's history of participation in lung cancer screening is suggested.  CPAP polysomnography report from 12 reviewed today -CPAP @ 6.0cm/H2O during sleep with 2L of O2 was suggested  ECHO on 10/12/15 reviewed today -normal right atrium, normal right ventricular size and function, normal tricuspid valve, minimal tricuspid regurgitation -estimated right atrial pressure = 8mm Hg -estimated right ventricular systolic pressure = 24 mm Hg  Spirometry 10/13/15 - c/w severe obstructive pattern with likely small airway involvment FVC: 1.35 (47%) FEV1: 0.94 (42%) FEV1/FVC: 70% HUD80-66: 0.61 (27%)  NiOx 10/13/15 normal= 7  CXR 10/15/15= clear  PS AHI: 51/hr - Lowerst Sat: 75% Patient notes compliance with current CPAP at level 2 and O2 overnight at 2L NC

## 2025-01-28 NOTE — HISTORY OF PRESENT ILLNESS
[Former] : former [TextBox_4] : 69 yo F PMH sciatica with 2 herniated discs, 3 brain aneurysms, minimally mobile has a scooter but tries to walk more, presents for F/U of [facility], active marijuana use, GERD, part of the lung cancer screening program, noncaseating granulomas found on subcarinal lymph node without evidence of MTB and CVD serologies negative. Continues on Brovana BID, budesonide BID, Incruse, and Singulair. DLCO noted to have decreased significantly, increased budesonide to BID ************ 1-28-25 She has lost 130 pounds, 278 to 146 pounds she was able to walk 30 blocks the other day she used to have to stop after 3-4 blocks  7-23-24: She was given steroids by primary care doctor 2 weeks ago, about 5 days duration, due to wheezing although she says she felt well from a breathing standpoint wiht no increased phlegm or dyspnea. Says her breathing has been "pretty good". Having daily cough, occasionally with white phlegm. Still losing weight, now 155 (previously 278lbs). Using incruse elipta daily, denies using brovana. Continues to use marijuana daily.   2-20-24: Patient stated that she was placed on prednisone by her new PCP about a week due to wheezing and productive cough. Patient states that her productive cough is chronic and so is her wheezing. She is not sure if the medications were really indicated and if she is improving. Sedentary most of the day. Mentions significant weight loss. Lost 70lbs with ozempic and decreased appetite. Stopped smoking cigarettes 10 years ago. Still smokes marijuana for her joint pain.  5-23-23: She lost 35 pounds, on ozempic She is complaining of worsening asthma symptoms  11-22-22: She is short of breath, worse after she takes a bath She reports asbestos and fungus were found in her bathroom. She is requiring nebulizers 2-3 times a day Phlegm is green She had a stress test on 8/31. When she finished the stress test, she was experiencing chest pain and went to the ER. She was diagnosed with Takotsubo cardiomyopathy. [TextBox_11] : 1 [TextBox_13] : 44 [YearQuit] : 2011

## 2025-01-28 NOTE — ASSESSMENT
[FreeTextEntry1] : 1-28-25  It was a pleasure to see Karyna during our follow-up visit today. Her respiratory issues are summarized:  1. Asthma/COPD overlap (ACO) Over 40 years of age, demonstrates moderate-severe airflow obstruction with FEV1/FVC of 50% of predicted. She has had asthma since 5 years old. With inhaled bronchodilator there is partial reversal of airflow obstruction 0.86 L -> 1.05 L, 41% -> 51%. She has been exposed to cigarette smoke. She has a 44 PY history of smoking. We have  maintained on Singulair, Incruse,  Brovana and budesonide. She continues to take her rescue inhaler sporadically. We will continue to monitor FEV1 on an annual basis to ensure no downward trend. She has abstained from cigarettes for over 10 years. She continues to use marijuana.   PFTs today (1/28/25) shows mild obstructive lung defect.  Over the past year, there is significant improvement in small airways, IKB94-23%, 0.20 -> 0.47 There is less air trapping, RV/TLC 64% -> 46% DLCO is 10.55 (54% of predicted). This is stable. Her walk distance improved by > 40 meters and saturation is significantly better. She walked 310 meters, SpO2  -> 95%.  She has lost over approximately 130 pounds, which is the most likely reason for improvement in PFTs and 6MWT  Recently, there have been several studies done showing that people who have an allergic asthma component of their asthma-COPD syndrome seem to have better quality of life and less exacerbations if they are put on biologic. ALENO study: using mepolizumab in a dose of 300mg subq every 4 weeks showed significant improvement in aforementioned endpoints. Two other studies FLAKOUS and NOTUS using dupilumab also showed improvements. We will perform testing for TH2 Asthma phenotype to determine if she is a suitable candidate.   Plan: - continue with triple inhaler therapy (Incruse (1 puff daily), Symbicort (advised to take 2 puffs BID), albuterol PRN) - obtain IgE, allergy panel, CBC with differential for eosinophil count.   2. Marijuana smoking We have gone over with Karyna the objective evidence of adverse effects of marijuana 1 higher mortality according to [place] study (MVA, CV, pulmonary Side Effects) 2, cough, wheeze and dyspnea 3. exacerbation of asthma 4. inconsistent results but possible cancer. Cancers: squamous cell of head and neck. In men testicular cancer 5. cardiac toxicity (ischemia, MI, arrhythmias) 6. Strokes 7. Cognitive dysfunction.   She continues to smoke marijuana (non-prescription).  3. Pulmonary nodules There were micro nodules seen on 8/23/21. Chest CT scan done 8/17/2022 demonstrates very tiny micro nodules none of which are new.  Plan: She is overdue for LCS LDCT Shared decision making done today  4. ELLA - she has lost over 120 lbs. We will obtain repeat sleep study off CPAP to determine if she still requires it.   5. PH  It is unlikely that she has pulmonary hypertension. On her last echo 2/2024 there was insufficient TR to calculate PASP  6. Chronic pain  Karyna's main physical complaint is her chronic pain. She has a referral to see a new pain specialist and also plans to start seeing a psychotherapist. She is not a candidate for surgery. She uses gabapentin.   Plan: - She should continue to follow closely with pain management specialist.   7. Lower extremity edema with elevated d-dimer Karyna has R > L lower extremity edema with venous stasis changes of the right leg. Her d-dimer was elevated to 364 on 8/16. Bilateral lower extremity venous doppler to r/o DVT was negative on 8/24  Return to clinic in 6 months.

## 2025-01-28 NOTE — PHYSICAL EXAM
[No Acute Distress] : no acute distress [No Resp Distress] : no resp distress [Clear to Auscultation Bilaterally] : clear to auscultation bilaterally [No Abnormalities] : no abnormalities [Benign] : benign [No Clubbing] : no clubbing [No Cyanosis] : no cyanosis [No Edema] : no edema [Normal Color/ Pigmentation] : normal color/ pigmentation [FROM] : FROM [No Focal Deficits] : no focal deficits [Oriented x3] : oriented x3 [Normal Affect] : normal affect [TextBox_2] : No pallor, no icterus [TextBox_11] : no JVD, no hepatojugular reflux  [TextBox_44] : no cervical adenopathy, no supraclavicular adenopathy [TextBox_54] : normal s1/s2, no murmurs, rubs or gallops [TextBox_68] : High pitched pan expiratory wheezing noted bilaterally on exhalation.   [TextBox_105] : 1+ pitting edema, equal bilaterally.

## 2025-04-01 NOTE — HISTORY OF PRESENT ILLNESS
[Aortic Stenosis] : no aortic stenosis [Atrial Fibrillation] : no atrial fibrillation [Coronary Artery Disease] : no coronary artery disease [Recent Myocardial Infarction] : no recent myocardial infarction [Implantable Device/Pacemaker] : implantable device/pacemaker [Asthma] : asthma [COPD] : COPD [Sleep Apnea] : sleep apnea [Smoker] : not a smoker [No Adverse Anesthesia Reaction] : no adverse anesthesia reaction in self or family member [Chronic Anticoagulation] : no chronic anticoagulation [Chronic Kidney Disease] : no chronic kidney disease [Diabetes] : no diabetes [Poor (<4 METs)] : Poor (<4 METs) [FreeTextEntry1] : Cataract Sx  [FreeTextEntry2] : 4/9/25 - 4/23/25 [FreeTextEntry4] :  is a 70 year F with PMH of PMH/PSH 3 prior brain aneurysms, balance problems, asthma-COPD overlap, depression, anxiety, seasonal allergies, 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 No complaints today.

## 2025-04-01 NOTE — ASSESSMENT
[Patient Optimized for Surgery] : Patient optimized for surgery [No Further Testing Recommended] : no further testing recommended [Continue medications as is] : Continue current medications [As per surgery] : as per surgery [FreeTextEntry4] : low risk procedure

## 2025-07-17 NOTE — HISTORY OF PRESENT ILLNESS
[FreeTextEntry8] :  70-year-old female is seen for an acute visit.  Patient states that she has a longstanding history of lower back pain with sciatica in her left lower extremity and has followed up with orthopedics and tried multiple medications including opioids, corticosteroid injections, lidocaine patch, baclofen, meloxicam in the past with minimal benefit.  She is taking gabapentin 4 mg 3 times daily and states that it does not provide much benefit.  She has never done physical therapy in the past.

## 2025-07-22 NOTE — ADDENDUM
[FreeTextEntry1] :  I, Dr. Damaso Mcmullen, personally performed the evaluation and management (E/M) services for this established patient who presents today with no new symptoms           I personally performed the services described in the documentation, reviewed the documentation recorded by my fellow                    I spent  a total of 60 min minutes evaluating the patient today. This includes spending 10 min on reviewing the patient's clinical history, serial CT scans and PFT's before the patients visit           The remaining time was spent with the patient in showing prior CT scan and comparing them with the current images. We discussed the pulmonary symptoms and the most likely causes of these symptoms. We discussed the sequence of investigations and the possible treatment strategy.  The questions were satisfactorily answered.           This time does not include performance of any procedures such as PFT or any teaching

## 2025-07-22 NOTE — ASSESSMENT
[FreeTextEntry1] : 7-22-25:  It was a pleasure to see Karyna during our follow-up visit today. Her respiratory issues are summarized:  1. Asthma/COPD overlap (ACO) Over 40 years of age, demonstrates moderate-severe airflow obstruction with FEV1/FVC of 50% of predicted. She has had asthma since 5 years old. With inhaled bronchodilator there is partial reversal of airflow obstruction 0.86 L -> 1.05 L, 41% -> 51%. She has been exposed to cigarette smoke. She has a 44 PY history of smoking. We have  maintained on Singulair, Incruse,  Brovana and budesonide. She continues to take her rescue inhaler sporadically. We will continue to monitor FEV1 on an annual basis to ensure no downward trend. She has abstained from cigarettes for over 10 years. She continues to use marijuana.   PFTs last(1/28/25) shows mild obstructive lung defect.  Over the past year, there is significant improvement in small airways, HQL79-95%, 0.20 -> 0.47 There is less air trapping, RV/TLC 64% -> 46% DLCO is 10.55 (54% of predicted). This is stable. Her walk distance improved by > 40 meters and saturation is significantly better. She walked 310 meters, SpO2  -> 95%.  She has lost over approximately 130 pounds, which is the most likely reason for improvement in PFTs and 6MWT  Recently, there have been several studies done showing that people who have an allergic asthma component of their asthma-COPD syndrome seem to have better quality of life and less exacerbations if they are put on biologic. ALENO study: using mepolizumab in a dose of 300mg subq every 4 weeks showed significant improvement in aforementioned endpoints. Two other studies FLAKOUS and NOTUS using dupilumab also showed improvements. We will perform testing for TH2 Asthma phenotype to determine if she is a suitable candidate.   Plan: - we will increase her Symbicort to 160 2 puffs BID, in addition to her umeclidinium with PRN albuterol  - CT chest in  for nodule f/u will also be will expiratory images to r/o TBM - not a candidate for Dupixent based on latest lab work  2. Marijuana smoking We have gone over with Karyna the objective evidence of adverse effects of marijuana 1 higher mortality according to [place] study (MVA, CV, pulmonary Side Effects) 2, cough, wheeze and dyspnea 3. exacerbation of asthma 4. inconsistent results but possible cancer. Cancers: squamous cell of head and neck. In men testicular cancer 5. cardiac toxicity (ischemia, MI, arrhythmias) 6. Strokes 7. Cognitive dysfunction.   She continues to smoke marijuana (non-prescription). We strongly recommended alternatives to marijuana smoking such as edible replacement  3. Pulmonary nodules, 1 new nodule There were micro nodules seen on 8/23/21. Chest CT scan done 8/17/2022 demonstrates very tiny micro nodules none of which are new. Her latest CT in 4/2025 showed a new rounded 4mm solid nodule in the RUL, not elliptical in shape so unlikely to be lymph node, not in the caty-fissural or sub-pleural  Plan: Repeat CT chest in 3 months; if no change, we will follow every 6 months for the first 2 years   4. ELLA hold off on repeat HST as she has lost signiicant weight  5. PH  It is unlikely that she has pulmonary hypertension. On her last echo 2/2024 there was insufficient TR to calculate PASP  6. Chronic pain  Karyna's main physical complaint is her chronic pain. She has a referral to see a new pain specialist and also plans to start seeing a psychotherapist. She is not a candidate for surgery. She uses gabapentin.   Plan: - She should continue to follow closely with pain management specialist.   7. Lower extremity edema with elevated d-dimer Karyna has R > L lower extremity edema with venous stasis changes of the right leg. Her d-dimer was elevated to 364 on 8/16. Bilateral lower extremity venous doppler to r/o DVT was negative on 8/24  Return to clinic in 6 months.

## 2025-07-22 NOTE — HISTORY OF PRESENT ILLNESS
[Former] : former [TextBox_4] : 71 yo F PMH sciatica with 2 herniated discs, 3 brain aneurysms, minimally mobile has a scooter but tries to walk more, presents for F/U of ACOS, active marijuana use, GERD, part of the lung cancer screening program, noncaseating granulomas found on subcarinal lymph node without evidence of MTB and CVD serologies negative. Continues on Brovana BID, budesonide BID, Incruse, and Singulair. DLCO noted to have decreased significantly, increased budesonide to BID ************  7-22-25: Breathing has been harder with humidity Using symbicort and inruse; was instructed to using symbicort BID but still using QD; using her albuterol 3-4x per week, she says increasing since the winter because of the heat Still using marijuana daily Had CT in 4/2025 which showed new 4mm RUL nodule suspicious for plugging Coughs copious phlegm daily  Lost 10 pounds since [place] Doesn't walk much over the past few months because her sciatica has been uncontrolled Has labs in April - Eos 170, IgE 118 in January Has appt with pain medicine in August Uses 2L O2 every night before bed  1-28-25 She has lost 130 pounds, 278 to 146 pounds she was able to walk 30 blocks the other day she used to have to stop after 3-4 blocks  7-23-24: She was given steroids by primary care doctor 2 weeks ago, about 5 days duration, due to wheezing although she says she felt well from a breathing standpoint wiht no increased phlegm or dyspnea. Says her breathing has been "pretty good". Having daily cough, occasionally with white phlegm. Still losing weight, now 155 (previously 278lbs). Using incruse elipta daily, denies using brovana. Continues to use marijuana daily.   2-20-24: Patient stated that she was placed on prednisone by her new PCP about a week due to wheezing and productive cough. Patient states that her productive cough is chronic and so is her wheezing. She is not sure if the medications were really indicated and if she is improving. Sedentary most of the day. Mentions significant weight loss. Lost 70lbs with ozempic and decreased appetite. Stopped smoking cigarettes 10 years ago. Still smokes marijuana for her joint pain.  5-23-23: She lost 35 pounds, on ozempic She is complaining of worsening asthma symptoms  11-22-22: She is short of breath, worse after she takes a bath She reports asbestos and fungus were found in her bathroom. She is requiring nebulizers 2-3 times a day Phlegm is green She had a stress test on 8/31. When she finished the stress test, she was experiencing chest pain and went to the ER. She was diagnosed with Takotsubo cardiomyopathy. [TextBox_11] : 1 [TextBox_13] : 44 [YearQuit] : 2011

## 2025-07-22 NOTE — ASSESSMENT
[FreeTextEntry1] : 7-22-25:  It was a pleasure to see Karyna during our follow-up visit today. Her respiratory issues are summarized:  1. Asthma/COPD overlap (ACO) Over 40 years of age, demonstrates moderate-severe airflow obstruction with FEV1/FVC of 50% of predicted. She has had asthma since 5 years old. With inhaled bronchodilator there is partial reversal of airflow obstruction 0.86 L -> 1.05 L, 41% -> 51%. She has been exposed to cigarette smoke. She has a 44 PY history of smoking. We have  maintained on Singulair, Incruse,  Brovana and budesonide. She continues to take her rescue inhaler sporadically. We will continue to monitor FEV1 on an annual basis to ensure no downward trend. She has abstained from cigarettes for over 10 years. She continues to use marijuana.   PFTs last(1/28/25) shows mild obstructive lung defect.  Over the past year, there is significant improvement in small airways, HGM27-91%, 0.20 -> 0.47 There is less air trapping, RV/TLC 64% -> 46% DLCO is 10.55 (54% of predicted). This is stable. Her walk distance improved by > 40 meters and saturation is significantly better. She walked 310 meters, SpO2  -> 95%.  She has lost over approximately 130 pounds, which is the most likely reason for improvement in PFTs and 6MWT  Recently, there have been several studies done showing that people who have an allergic asthma component of their asthma-COPD syndrome seem to have better quality of life and less exacerbations if they are put on biologic. ALENO study: using mepolizumab in a dose of 300mg subq every 4 weeks showed significant improvement in aforementioned endpoints. Two other studies FLAKOUS and NOTUS using dupilumab also showed improvements. We will perform testing for TH2 Asthma phenotype to determine if she is a suitable candidate.   Plan: - we will increase her Symbicort to 160 2 puffs BID, in addition to her umeclidinium with PRN albuterol  - CT chest in  for nodule f/u will also be will expiratory images to r/o TBM - not a candidate for Dupixent based on latest lab work  2. Marijuana smoking We have gone over with Karyna the objective evidence of adverse effects of marijuana 1 higher mortality according to [place] study (MVA, CV, pulmonary Side Effects) 2, cough, wheeze and dyspnea 3. exacerbation of asthma 4. inconsistent results but possible cancer. Cancers: squamous cell of head and neck. In men testicular cancer 5. cardiac toxicity (ischemia, MI, arrhythmias) 6. Strokes 7. Cognitive dysfunction.   She continues to smoke marijuana (non-prescription). We strongly recommended alternatives to marijuana smoking such as edible replacement  3. Pulmonary nodules, 1 new nodule There were micro nodules seen on 8/23/21. Chest CT scan done 8/17/2022 demonstrates very tiny micro nodules none of which are new. Her latest CT in 4/2025 showed a new rounded 4mm solid nodule in the RUL, not elliptical in shape so unlikely to be lymph node, not in the caty-fissural or sub-pleural  Plan: Repeat CT chest in 3 months; if no change, we will follow every 6 months for the first 2 years   4. ELLA hold off on repeat HST as she has lost signiicant weight  5. PH  It is unlikely that she has pulmonary hypertension. On her last echo 2/2024 there was insufficient TR to calculate PASP  6. Chronic pain  Karyna's main physical complaint is her chronic pain. She has a referral to see a new pain specialist and also plans to start seeing a psychotherapist. She is not a candidate for surgery. She uses gabapentin.   Plan: - She should continue to follow closely with pain management specialist.   7. Lower extremity edema with elevated d-dimer Karyna has R > L lower extremity edema with venous stasis changes of the right leg. Her d-dimer was elevated to 364 on 8/16. Bilateral lower extremity venous doppler to r/o DVT was negative on 8/24  Return to clinic in 6 months.

## 2025-07-22 NOTE — HISTORY OF PRESENT ILLNESS
[Former] : former [TextBox_4] : 71 yo F PMH sciatica with 2 herniated discs, 3 brain aneurysms, minimally mobile has a scooter but tries to walk more, presents for F/U of ACOS, active marijuana use, GERD, part of the lung cancer screening program, noncaseating granulomas found on subcarinal lymph node without evidence of MTB and CVD serologies negative. Continues on Brovana BID, budesonide BID, Incruse, and Singulair. DLCO noted to have decreased significantly, increased budesonide to BID ************  7-22-25: Breathing has been harder with humidity Using symbicort and inruse; was instructed to using symbicort BID but still using QD; using her albuterol 3-4x per week, she says increasing since the winter because of the heat Still using marijuana daily Had CT in 4/2025 which showed new 4mm RUL nodule suspicious for plugging Coughs copious phlegm daily  Lost 10 pounds since [place] Doesn't walk much over the past few months because her sciatica has been uncontrolled Has labs in April - Eos 170, IgE 118 in January Has appt with pain medicine in August Uses 2L O2 every night before bed  1-28-25 She has lost 130 pounds, 278 to 146 pounds she was able to walk 30 blocks the other day she used to have to stop after 3-4 blocks  7-23-24: She was given steroids by primary care doctor 2 weeks ago, about 5 days duration, due to wheezing although she says she felt well from a breathing standpoint wiht no increased phlegm or dyspnea. Says her breathing has been "pretty good". Having daily cough, occasionally with white phlegm. Still losing weight, now 155 (previously 278lbs). Using incruse elipta daily, denies using brovana. Continues to use marijuana daily.   2-20-24: Patient stated that she was placed on prednisone by her new PCP about a week due to wheezing and productive cough. Patient states that her productive cough is chronic and so is her wheezing. She is not sure if the medications were really indicated and if she is improving. Sedentary most of the day. Mentions significant weight loss. Lost 70lbs with ozempic and decreased appetite. Stopped smoking cigarettes 10 years ago. Still smokes marijuana for her joint pain.  5-23-23: She lost 35 pounds, on ozempic She is complaining of worsening asthma symptoms  11-22-22: She is short of breath, worse after she takes a bath She reports asbestos and fungus were found in her bathroom. She is requiring nebulizers 2-3 times a day Phlegm is green She had a stress test on 8/31. When she finished the stress test, she was experiencing chest pain and went to the ER. She was diagnosed with Takotsubo cardiomyopathy. [TextBox_11] : 1 [TextBox_13] : 44 [YearQuit] : 2011

## 2025-07-22 NOTE — PHYSICAL EXAM
[No Acute Distress] : no acute distress [No Resp Distress] : no resp distress [Clear to Auscultation Bilaterally] : clear to auscultation bilaterally [No Abnormalities] : no abnormalities [Benign] : benign [No Clubbing] : no clubbing [No Cyanosis] : no cyanosis [FROM] : FROM [Normal Color/ Pigmentation] : normal color/ pigmentation [No Focal Deficits] : no focal deficits [Oriented x3] : oriented x3 [Normal Affect] : normal affect [TextBox_2] : No pallor, no icterus [TextBox_11] : no JVD, no hepatojugular reflux  [TextBox_44] : shotty lymph nodes in left anterior cervical chain [TextBox_54] : normal s1/s2, no murmurs, rubs or gallops [TextBox_68] : High pitched pan expiratory wheezing noted bilaterally on exhalation.   [TextBox_80] : buffalo hump [TextBox_105] : 1+ pitting edema, equal bilaterally. [TextBox_125] : linear reticulations on legs and thighs bilaterally

## 2025-07-22 NOTE — PROCEDURE
[Thoracic Ultrasound] : Thoracic Ultrasound [A line] : A line: Yes [Lung sliding] : Lung sliding: Yes [Pleural Effusion] : Pleural Effusion: No [Consolidation] : Consolidation: No [de-identified] : abnormal physical exam finding [FreeTextEntry2] : No signs of pulmonary edema or pleural effusions. Normal lung POCUS examination. [FreeTextEntry1] : Lung cancer screening CT 25 Impression: 1. New 4 mm solid nodule within the posterior segment of the right upper lobe which may represent a small focus of mucous plugging. 2. Redemonstrated are additional stable solid micronodules. Lung-RADS category: 3 - Probably Benign based on imaging features or indolent behavior. Recommendation: 3 - 6-month LDCT.  PFT 25 FVC: 1.83 L (71%)--> 1.86 L (72%)  FEV1: 1.14 L (57%)--> 1.14 L (57%)  FEV1/FVC: 62%--> 62% UPQ85-67%: 0.47 L/s (26%)--> 0.40 L/s (22%) TLC: 3.38 L (76%) RV/T% DLCO: 10.55 (54%) NIOX 6 ppb  6MWT 25: 310 meters, SpO2 97% -> 95%  PFT 24  FVC: 1.56 L (65%)--> 1.79 L (75%)   FEV1: 0.87 L (46%)--> 1.03 L (55%)   FEV1/FVC: 56%--> 58% MIS43-98%: 0.20 L/s (12%)--> 0.25 L/s (15%) T.73 L (113%) RV/T% DLCO: 14.87 (91%)  6MWT 24: 257 METERS, SPo2 94% -> 90%  Patient had an annual Low Dose CT for Lung Cancer screening on 23 Results of scan: Impression: Since 2022, there are again a few micronodules bilaterally. Lung-RADS category: 2  22 LDCT CHEST Report pending Images reviewed by Dr. Mcmullen personally Mild dilatation of airways with thickening of the walls discoid atelectasis on lingular segment significant Coronary Artery calcification No visible nodules. Previous nodules in RUL not seen No lung parenchymal abnormalities ***** 22 ECHO Normal LV and RV size and funtion No significant valvular disease insufficient TR to calculate PASP ***** PFT 22 FVC: 1.48 L (54%)--> 2.17 L (81%)   FEV1: 0.86 L (41%)--> 1.05 L (51%)   FEV1/FVC: 59%--> 48% EJH08-70%: 0.29 L/s (15%)--> 0.25 L/s (13%) TLC: 3.61 L (81%) RV/T% DLCO: 9.97 (50%)  EXAM: CT LDCT LUNG CA SCRN ANNUAL PROCEDURE DATE: 2020  Lungs and airways: Image numbers for description of nodule location refer to thin section axial series number 4.  The previously seen nodular opacity in the right lower lobe has resolved, indicating that it was probably caused by atelectasis. There is a small amount of dependent atelectasis in the left lower lobe. No change in a few bilateral micronodules, such as in the apical segment of the right upper lobe on image 63, in the anterior segment of the right upper lobe on image 94, and in the superior segment of the left lower lobe on image 156. A benign-appearing calcified nodule nodule in the anterobasal segment of the right lower lobe on image 176 unchanged. There is again mild centrilobular emphysema. Bronchial wall thickening again present bilaterally.  Pleura: The pleural spaces are clear.  Base of neck, mediastinum and heart: No change 0.6 cm calcified nodule right lobe of thyroid. No mediastinal, hilar or axillary lymphadenopathy is seen.  The heart and pericardium are within normal limits.  Vessels/Coronary artery calcification: Small calcified plaque aorta. Moderate coronary artery calcification.  Soft tissues: Left chest wall implant has pacemaker leads within right atrium and right ventricle.  Abdomen: Small hiatal hernia. Again post sleeve gastrectomy.  Bones: Degenerative changes of spine.  Impression: Since 3/5/2019, there has been no change in a few micronodules bilaterally. Probable atelectasis in the right lower lobe has resolved.  Lung-RADS category: 2 - Benign appearance or behavior. Nodules with very low likelihood of becoming a clinically active cancer due to size or lack of growth. Probability of malignancy < 1%.  Recommendation: Continue annual screening with LDCT in 12 months.   PFT and 6MWT 19: FVC: 1.54 L (59%) --> 1.62 L (62%) FEV1: 0.94 L (45%) --> 1.06 L/s (51%) FEV1/FVC: 61% --> 66% OBM28-60%: 0.40 L/s (18%) --> 0.51 L/s (23%) TLC: 3.45 L (81%) RV/T% DLCO: 15.3 (65%) 6MWT: 366 meters, SpO2 start (on RA): 94% --> SpO2 end (on RA): 94% Moderate obstructive lung defect. FEV1 increased by 13% post-inhaled bronchodilator. RMO90-45% increased by 27% post-inhaled bronchodilator. MIld restrictive lung defect. Mildly decreased diffusion capacity. Improved walk distance with no desaturation.   Spirometry and NIOX 19: pt declined  EXAM: CT LDCT LUNG CA SHORT TERM F U PROCEDURE DATE: 2019  Lungs and airways: Image numbers for description of nodule location refer to thin section axial series number 4.  The nodular opacity in the posterior basal segment of the right lower lobe on image 265 has decreased in size from 19 x 16 mm (13 mm average) to 13 x 9 mm (11 mm average). No change solid micronodule right apex, image 106. No new lung nodule.  Pleura: The pleural spaces are clear.  Base of neck, mediastinum and heart: The thyroid gland is normal. No mediastinal, hilar or axillary lymphadenopathy is seen. The heart is enlarged. No pericardial effusion. Small calcified plaque aorta.  Coronary artery calcification: Moderate.  Soft tissues: There is a left chest wall implant with pacemaker leads in the right atrium and right ventricle.  Abdomen: Again post sleeve gastrectomy. Small hiatal hernia.  Bones: Degenerative changes of spine.  Impression: Since 2018, the nodular opacity in the right lower lobe has decreased in size. It could be an area of atelectasis.  Lung-RADS category: 2 - Benign appearance or behavior. Nodules with very low likelihood of becoming a clinically active cancer due to size or lack of growth. Probability of malignancy < 1%.  Recommendation: Continue annual screening with LDCT in 12 months.   PFT 19: Pt declined PFT testing today.  EXAM: CT LDCT LUNG CA SCRN ANNUAL PROCEDURE DATE: 2018  INTERPRETATION: CT scan of the chest without intravenous contrast, using low-dose lung cancer screening protocol  History: 64-year-old former smoker with a 44 pack year history of smoking.  Comparison: 2017.  Findings:  Lungs and airways: Image numbers for description of nodule location refer to thin section axial series number 4. There is new focus of tree-in-bud micronodular opacities seen within the posterior segment of the right upper lobe (series 4, image 111). There has been interval resolution of linear atelectasis within the medial basal segment of the right middle lobe. Interval resolution of linear atelectasis within the right lower lobe. New tree-in-bud micronodular opacities are seen within the posterior basal segment of the right lower lobe (series 4, image 187 Linear atelectasis over the medial basal segment of the right lower lobe (image 4, 189.   NODULE 1:  Location: Posterior basal segment of the right lower lobe, Image #(axial image 209, sagittal image 64, and coronal image 51)  Size: 12.5 mm  Contains Fat: No  Calcification: None  Morphology: Round  Margins: Smooth  Change from prior: New   Trachea and central bronchi patent.  Pleura: New probable nodular pleural thickening versus atelectasis as described above involving the medial basal segments of right lower lobe (axial image 189 and sagittal image 77).  Base of neck, mediastinum and heart: The thyroid gland is normal. No mediastinal, hilar or axillary lymphadenopathy is seen. There is cardiomegaly. Pericardium are within normal limits. There is a left-sided  permanent pacemaker. The pacer wires are again noted in the region of the right atrium and right ventricle.  Coronary artery calcification: Mild  Soft tissues: Normal.  Abdomen: There is streak artifact however grossly unchanged and unremarkable unenhanced appearance. Probable left-sided parapelvic cysts sleeve gastrectomy.  Bones: Severe thoracolumbar scoliosis.   Impression:  1. Exacerbation in small airway disease with new tree-in-bud centrilobular micronodules within the right upper and right lower lobes.  2. New nodular pleural thickening over the medial basal segment of the right lower lobe.  3. In addition there is a new nodular opacity which may represent an area of rounded atelectasis versus a new nodule measuring 12.5 mm involving the posterior basal segment the right lower lobe.   Lung-RADS category:   4B - Suspicious findings for which additional diagnostic testing and/or  tissue sampling is recommended. Probability of malignancy > 15%.   Recommendation:  4B - chest CT with or without contrast, PET/CT and/or tissue sampling  depending on the probability of malignancy and comorbidities. PET/CT may be  used when there is a = 8 mm solid component.   Rajeev and 6MWT 18: FVC: 1.66 L (69%) --> 1.75 L (72%) FEV1: 1.18 L (60%) --> 1.17 L (60%) FEV1/FVC: 71% --> 67% BJM80-25%: 0.68 L/s (31%) --> 0.68 L/s (31%) 6MWT: 220 meters, SpO2 start: 94% --> SpO2 end: 94% Cannot r/o mild restrictive defect.  No significant expiratory airflow obstruction. No bronchodilator response. Reduced walk distance with no desaturation;  pt feels she may have been able to walk faster during the test.  Rajeev and DLCO 18: FVC: 1.44 L (51%) --> 1.74 L (62%) FEV1: 1.00 L (46%) --> 1.13 L (52%) FEV1/FVC: 70% --> 65% PCP12-84%: 0.60 L/s (28%) --> 0.48 L/s (22%) DLCO: 9.9 (41%) Severe obstruction with significant response to inhaled bronchodilator (in FVC). Moderatley decreased diffusion capacity.   Viola and DLCO, 6MWT 18: FVC: 1.97 L (70%) --> 2.03 L (73%) FEV1: 1.30 L (60%) --> 1.43 L (65%) FEV1/FVC: 66% --> 70% MNW40-75%: 0.63 L/s (29%) --> 0.80 L/s (37%) DLCO: 17.2 (72%) 6MWT: 288m, SpO2 start: 94%, SpO2 end: 95% Mildly reduced FVC. Mild obstructive defect. Mildly reduced diffusion capacity.  Reduced walk distance. No desaturation.  BAL 17: AFB culture: No AFB at 6 weeks Fungal culture: No fungus at 4 weeks C&S: Normal respiratory denise Fungitell: <45 Galactomannan: <0.500 Fluid segmented granulocytes: 94% Lymphocytes: 1% Monocytes: 4% Eosinophil: 1%  Spirometry 17: FVC: 1.67 L (60%) --> 1.63 L (58%) FEV1: 1.03 L (47%) --> 1.05 L (48%) FEV1/FVC: 62% --> 64% MPA98-35%: 0.41 L/s (19%) --> 0.41 L/s (19%) Severe obstruction with no significant response to inhaled bronchodilator.   NIOX 17: 16 ppb Normal exhaled nitric oxide level.   EXAM:  CT NECK SOFT TISSUE, 2017 COMPARISON: CT angiogram of the neck 2013. FINDINGS: The aerodigestive tract is patent, but suboptimally assessed with intravenous contrast. There is no masslike asymmetry or fluid collection. At the level of the thoracic inlet just below the thyroid gland there is a slight contour deformity of the right posterior lateral tracheal wall. There is expected flattening and slight inward convexity of the posterior tracheal wall during this scan done at quiet respiration. Volume rendered 3D images are generated by the attending radiologist using a separate workstation.  There is no significant luminal compromise. The included lung apices appear grossly clear, however please see dedicated report from the contemporaneous chest CT for complete evaluation. A left chest wall cardiac pacer device noted. Review of the guadalupe stations demonstrates scattered subcentimeter nodes which do not meet imaging criteria for pathologic lymphadenopathy. Surgical clips identified from partial left superficial parotidectomy. The right parotid gland and both submandibular glands appear unremarkable on this noncontrast study. The osseous structures show cervical spondylosis at C4-5 level. The visualized intracranial and intraorbital compartments fail to demonstrate an acute abnormality. There is a large nasal septal defect. IMPRESSION: CT neck shows a patent airway. There is minimal contour deformity of the right posterolateral upper trachea. This is present on this exam done under quiet respiration. For more complete evaluation of the chest, please see dedicated report from the concurrent chest CT.  EXAM:  CT LDCT LUNG CA SCRN BASELINE *** ADDENDUM 2017  *** Addendum: 1. 4 mm noncalcified nodule within the lateral aspect of the apical segment of the right upper lobe (image 72) on further inspection was present on examination dated 10/17/2016, and 2016 and essentially unchanged allowing for differences in imaging technique. 2. Previously described 8 mm nodule within the posterior basal segment of the left lower lobe on multiplanar reformations has a more linear configuration consistent with a new focus of probable atelectasis as demonstrated in the posterior basal segment of the right lower lobe (image 259, and coronal image 125, series 89145, and sagittal image 17, series 52895). Revised Lung-Rads Category: 2  Recommendation: Continue annual screening with LDCT in 12 months. *** END OF ADDENDUM 2017  ***  PROCEDURE DATE:  2017 INTERPRETATION:  CT scan of the chest without intravenous contrast, using low-dose lung cancer screening protocol History: 44 year smoking history. Quit 6 years ago. Lung cancer screening. Comparison: CT chest from 10/17/2016. Findings: Lungs and airways: (Series 4) Minimal biapical paraseptal and mild upper lung zone centrilobular emphysema. There is increasing mild small airway inflammation characterized by scattered micronodules in a centrilobular tree-in-bud pattern (image 113, and 221) in the right upper, and right lower lobes respectively. There is increasing mild large airway inflammation characterized by bronchial wall thickening and bronchiectasis (images 94, 128 and 185). There are multiple new more discrete solid micronodules identified bilaterally thought to represent areas of mucous plugging largest in the apical segment of the right upper lobe measuring 4 mm (image 72) and in the posterior basal segment of the left lower lobe there is a new subpleural nodular and linear opacity measuring 8 mm (image 59) There are new linear opacities identified bilaterally in the posterior basal segment of the right lower lobe (image 245) as well as in the inferior medial aspect of the medial segment of the right middle lobe (image 214) thought to represent new areas of atelectasis Impression: 1.  Lung Rad category 4A:  New 7 mm subpleural nodule in posterior basal segment of left lower lobe with an adjacent linear component. This may represent a new area of atelectasis. Continue annual screening with low dose contrast CT in 12 months. 2.  Lung Rad category 3:  New 4 mm solid nodule within the apical segment of the right upper lobe. 3.  New mild small and large airway inflammation as described above. 4.  Minimal paraseptal and mild upper lung zone centrilobular emphysema. 5.  Mild left-sided coronary artery calcification.  Lung-RADS category: 3 - Probably benign finding - short term follow up suggested; includes nodules with a low likelihood of becoming a clinically active cancer. Probability of malignancy 1-2%.  4A - Suspicious findings for which additional diagnostic testing and/or tissue sampling is recommended. Probability of malignancy 5-15%.  Recommendation: 3 - 6 month LDCT. 4A - 3 month LDCT; PET/CT may be used when there is a = 8 mm solid component  NIOX 10/24/17: 10ppb Normal exhaled nitric oxide level.  Spirometry 10/24/17: FVC: 1.56 L (56%) FEV1: 1.04 L (48%) FEV1/FVC: 66% FZK45-56%: 0.55 L/s (26%) Moderate airway obstruction.  Cannot rule out restriction.  CXR 10/24/17: No obvious parenchymal lung opacities. No pleural effusion.  Maximal height of right hemidiaphragm located at 1/3 distance from lateral edge.   Spirometry and DLCO 17: FVC: 1.62L (57%) FEV1: 0.89L (40%) FEV1/FVC: 55% OMQ57-90%: 0.31L/s (14%) DLCO: 11.1 (48%) Moderate obstructive airways disease. Moderately reduced DLCO.   6MWT 17:  Distance: 288 m with walker, SpO2 start: 95%, SpO2 end: 88% Reduced walk distance with significant desaturation down to SpO2 88%.   Spirometry and DLCO 17: FVC: 1.55 L (55% pred) FEV1: 0.88 L (40% pred) FEV1/FVC: 57% UMK28-92%: 0.32 L/s (14% pred) DLCO: 11.7 (51% pred) Moderate obstructive airways disease. Moderately reduced DLCO.   PFT 3/28/17: FVC: 1.68 L (59% pred) --> 1.70 L (60% pred) FEV1: 1.08 L (49% pred) --> 1.11 L (50% pred) FEV1/FVC: 64% --> 65% [id number]%: 0.47 L/s (21% pred) --> 0.49 L/s (22% pred) TLC: 3.46 L (81% pred) RV/T% DLCO: 12.3 (54% pred) Mild obstruction.  Lung volumes normal.  Air trapping present.  Severely reduced DLCO.   17: FVC: 1.61 L (60% pred) FEV1: 1.03 (48% pred) FEV1/FVC: 64% FGU19-20%: 0.43 L/s (19% pred) Moderate obstructive defect, notably of small airways.  FVC and FEV1 reduced, cannot rule out restriction.    EXAM:  CT CHEST    10/17/2016 Evaluation of the chest demonstrates the visualized thyroid gland is normal in appearance. The heart is normal size. Coronary arterial calcification. No evidence of small airways disease. No evidence of  clustered tree-in-bud branching nodularity. There is a calcified nodule of the right lobe of the thyroid gland measuring 5 mm. No pleural or pericardial effusion. For thoracic inlet, axillary, mediastinal or hilar  adenopathy. Evaluation of the lung parenchyma is negative for pulmonary consolidation or mass. No endobronchial lesion. Minimal biapical centrilobular emphysematous change. Evaluation of the upper abdomen demonstrate a small hiatal hernia and post surgical changes from gastric bypass. Evaluation of the osseous structures is unremarkable. IMPRESSION: No suspicious pulmonary finding.No significant bronchiolitis.  9/15/16 Spirometry: FEV1 0.97L (45%), FVC 1.46L (54%), FEV1/FVC 66% NIOX = 6 CXR - Clear, no infiltrates  6 minute walk test performed 3/1/16: The patient covered a distance of 215 m (significantly improved from last time). The baseline SpO2 was 93%. At the end of exercise it was 91% which is not significantly different. Last while the distance was reduced to approximately 50% of predicted for this lady, there was no desaturation  3/1/16: The forced vital capacity was 1.38 L (48% predicted). The FEV1 was 0.91 L (41% predicted ). The FEV1/FVC was 66%. This shows a very significant reduction as compared to the previous tests.  6MWT 16: Distance: 172m, SpO2 start: 92.7%, SpO2 end: 94% Significantly reduced walk distance.  No significant change in SpO2 with exercise.  PFT 17: FVC: 1.69 L (59% pred) <-- from 1.35 L (47% pred) in 10/15 FEV1: 1.22 L (55% pred) <-- from 0.94 L (42% pred) in 10/15 FEV1/FVC: 72%<-- from 70% in 10/15 [id number]%: 0.81 L (36% pred) <-- from 0.61 L/s (27% pred) in 10/15 TLC: 3.40 L (79% pred) DLCO: 14.1 (60% pred) Mixed defect.  Mild improvement in flow after bronchodilator but not c/w positive response. Moderate diffusion capacity decrease.  CT of the CHEST  DATE:  2015 LUNGS: No pleural effusions are seen. There are multiple areas of centrilobular nodules with a branching configuration compatible with a productive bronchiolitis. More linear opacities are noted which may represent areas of mucous plugging within the lateral segment of the right middle lobe as well as the lateral basal segment of the right lower lobe. There is bronchial wall thickening to suggest large airway inflammation. MEDIASTINUM: The heart is normal in size.  No pericardial effusion is seen. Pulmonary arteries and thoracic aorta within normal limits. There is mild to moderate calcification of the left coronary artery. Heart size is within normal limits. No pericardial effusion. No axillary or supraclavicular lymphadenopathy. UPPER ABDOMEN, SOFT TISSUES AND BONES: Limited evaluation of the upper abdomen demonstrates evidence of chain sutures along the greater curvature of the stomach consistent with gastric sleeve procedure. Evaluation of the osseous structures demonstrates degenerative changes. IMPRESSION: Evidence of moderate to severe small airway inflammation characterized by productive bronchiolitis with a lesser involvement of the large airways. This is most pronounced in the distribution of the right middle and right lower lobes. No significant air-trapping is identified. Interval follow-up imaging after therapeutic administration may be of value in approximately 3 months. No suspicious parenchymal mass, lobar consolidation or pleural collections.Consideration, in view of the patient's history of participation in lung cancer screening is suggested.  CPAP polysomnography report from 12 reviewed today -CPAP @ 6.0cm/H2O during sleep with 2L of O2 was suggested  ECHO on 10/12/15 reviewed today -normal right atrium, normal right ventricular size and function, normal tricuspid valve, minimal tricuspid regurgitation -estimated right atrial pressure = 8mm Hg -estimated right ventricular systolic pressure = 24 mm Hg  Spirometry 10/13/15 - c/w severe obstructive pattern with likely small airway involvment FVC: 1.35 (47%) FEV1: 0.94 (42%) FEV1/FVC: 70% NVT42-74: 0.61 (27%)  NiOx 10/13/15 normal= 7  CXR 10/15/15= clear  PS AHI: 51/hr - Lowerst Sat: 75% Patient notes compliance with current CPAP at level 2 and O2 overnight at 2L NC

## 2025-07-22 NOTE — DISCUSSION/SUMMARY
[FreeTextEntry1] : ATTENDING'S SUMMARY: 7-22-25: No pallor, no icterus.  No cervical adenopathy, no supraclavicular adenopathy. No JVD in the sitting position No cyanosis, no clubbing, no articular manifestations, no Raynaud's, good radial pulses, no thickening on dorsum of skin, no subcutaneous nodules.  High pitched pan expiratory wheezing noted bilaterally on exhalation.  Cardiovascular sounds distant, normal S1, S2, no murmurs, rubs or gallops.  1+ pitting edema, equal bilaterally.

## 2025-07-22 NOTE — PROCEDURE
[Thoracic Ultrasound] : Thoracic Ultrasound [A line] : A line: Yes [Lung sliding] : Lung sliding: Yes [Pleural Effusion] : Pleural Effusion: No [Consolidation] : Consolidation: No [de-identified] : abnormal physical exam finding [FreeTextEntry2] : No signs of pulmonary edema or pleural effusions. Normal lung POCUS examination. [FreeTextEntry1] : Lung cancer screening CT 25 Impression: 1. New 4 mm solid nodule within the posterior segment of the right upper lobe which may represent a small focus of mucous plugging. 2. Redemonstrated are additional stable solid micronodules. Lung-RADS category: 3 - Probably Benign based on imaging features or indolent behavior. Recommendation: 3 - 6-month LDCT.  PFT 25 FVC: 1.83 L (71%)--> 1.86 L (72%)  FEV1: 1.14 L (57%)--> 1.14 L (57%)  FEV1/FVC: 62%--> 62% IZR33-13%: 0.47 L/s (26%)--> 0.40 L/s (22%) TLC: 3.38 L (76%) RV/T% DLCO: 10.55 (54%) NIOX 6 ppb  6MWT 25: 310 meters, SpO2 97% -> 95%  PFT 24  FVC: 1.56 L (65%)--> 1.79 L (75%)   FEV1: 0.87 L (46%)--> 1.03 L (55%)   FEV1/FVC: 56%--> 58% MPT15-90%: 0.20 L/s (12%)--> 0.25 L/s (15%) T.73 L (113%) RV/T% DLCO: 14.87 (91%)  6MWT 24: 257 METERS, SPo2 94% -> 90%  Patient had an annual Low Dose CT for Lung Cancer screening on 23 Results of scan: Impression: Since 2022, there are again a few micronodules bilaterally. Lung-RADS category: 2  22 LDCT CHEST Report pending Images reviewed by Dr. Mcmullen personally Mild dilatation of airways with thickening of the walls discoid atelectasis on lingular segment significant Coronary Artery calcification No visible nodules. Previous nodules in RUL not seen No lung parenchymal abnormalities ***** 22 ECHO Normal LV and RV size and funtion No significant valvular disease insufficient TR to calculate PASP ***** PFT 22 FVC: 1.48 L (54%)--> 2.17 L (81%)   FEV1: 0.86 L (41%)--> 1.05 L (51%)   FEV1/FVC: 59%--> 48% ZYM27-80%: 0.29 L/s (15%)--> 0.25 L/s (13%) TLC: 3.61 L (81%) RV/T% DLCO: 9.97 (50%)  EXAM: CT LDCT LUNG CA SCRN ANNUAL PROCEDURE DATE: 2020  Lungs and airways: Image numbers for description of nodule location refer to thin section axial series number 4.  The previously seen nodular opacity in the right lower lobe has resolved, indicating that it was probably caused by atelectasis. There is a small amount of dependent atelectasis in the left lower lobe. No change in a few bilateral micronodules, such as in the apical segment of the right upper lobe on image 63, in the anterior segment of the right upper lobe on image 94, and in the superior segment of the left lower lobe on image 156. A benign-appearing calcified nodule nodule in the anterobasal segment of the right lower lobe on image 176 unchanged. There is again mild centrilobular emphysema. Bronchial wall thickening again present bilaterally.  Pleura: The pleural spaces are clear.  Base of neck, mediastinum and heart: No change 0.6 cm calcified nodule right lobe of thyroid. No mediastinal, hilar or axillary lymphadenopathy is seen.  The heart and pericardium are within normal limits.  Vessels/Coronary artery calcification: Small calcified plaque aorta. Moderate coronary artery calcification.  Soft tissues: Left chest wall implant has pacemaker leads within right atrium and right ventricle.  Abdomen: Small hiatal hernia. Again post sleeve gastrectomy.  Bones: Degenerative changes of spine.  Impression: Since 3/5/2019, there has been no change in a few micronodules bilaterally. Probable atelectasis in the right lower lobe has resolved.  Lung-RADS category: 2 - Benign appearance or behavior. Nodules with very low likelihood of becoming a clinically active cancer due to size or lack of growth. Probability of malignancy < 1%.  Recommendation: Continue annual screening with LDCT in 12 months.   PFT and 6MWT 19: FVC: 1.54 L (59%) --> 1.62 L (62%) FEV1: 0.94 L (45%) --> 1.06 L/s (51%) FEV1/FVC: 61% --> 66% BBS14-34%: 0.40 L/s (18%) --> 0.51 L/s (23%) TLC: 3.45 L (81%) RV/T% DLCO: 15.3 (65%) 6MWT: 366 meters, SpO2 start (on RA): 94% --> SpO2 end (on RA): 94% Moderate obstructive lung defect. FEV1 increased by 13% post-inhaled bronchodilator. GAX00-64% increased by 27% post-inhaled bronchodilator. MIld restrictive lung defect. Mildly decreased diffusion capacity. Improved walk distance with no desaturation.   Spirometry and NIOX 19: pt declined  EXAM: CT LDCT LUNG CA SHORT TERM F U PROCEDURE DATE: 2019  Lungs and airways: Image numbers for description of nodule location refer to thin section axial series number 4.  The nodular opacity in the posterior basal segment of the right lower lobe on image 265 has decreased in size from 19 x 16 mm (13 mm average) to 13 x 9 mm (11 mm average). No change solid micronodule right apex, image 106. No new lung nodule.  Pleura: The pleural spaces are clear.  Base of neck, mediastinum and heart: The thyroid gland is normal. No mediastinal, hilar or axillary lymphadenopathy is seen. The heart is enlarged. No pericardial effusion. Small calcified plaque aorta.  Coronary artery calcification: Moderate.  Soft tissues: There is a left chest wall implant with pacemaker leads in the right atrium and right ventricle.  Abdomen: Again post sleeve gastrectomy. Small hiatal hernia.  Bones: Degenerative changes of spine.  Impression: Since 2018, the nodular opacity in the right lower lobe has decreased in size. It could be an area of atelectasis.  Lung-RADS category: 2 - Benign appearance or behavior. Nodules with very low likelihood of becoming a clinically active cancer due to size or lack of growth. Probability of malignancy < 1%.  Recommendation: Continue annual screening with LDCT in 12 months.   PFT 19: Pt declined PFT testing today.  EXAM: CT LDCT LUNG CA SCRN ANNUAL PROCEDURE DATE: 2018  INTERPRETATION: CT scan of the chest without intravenous contrast, using low-dose lung cancer screening protocol  History: 64-year-old former smoker with a 44 pack year history of smoking.  Comparison: 2017.  Findings:  Lungs and airways: Image numbers for description of nodule location refer to thin section axial series number 4. There is new focus of tree-in-bud micronodular opacities seen within the posterior segment of the right upper lobe (series 4, image 111). There has been interval resolution of linear atelectasis within the medial basal segment of the right middle lobe. Interval resolution of linear atelectasis within the right lower lobe. New tree-in-bud micronodular opacities are seen within the posterior basal segment of the right lower lobe (series 4, image 187 Linear atelectasis over the medial basal segment of the right lower lobe (image 4, 189.   NODULE 1:  Location: Posterior basal segment of the right lower lobe, Image #(axial image 209, sagittal image 64, and coronal image 51)  Size: 12.5 mm  Contains Fat: No  Calcification: None  Morphology: Round  Margins: Smooth  Change from prior: New   Trachea and central bronchi patent.  Pleura: New probable nodular pleural thickening versus atelectasis as described above involving the medial basal segments of right lower lobe (axial image 189 and sagittal image 77).  Base of neck, mediastinum and heart: The thyroid gland is normal. No mediastinal, hilar or axillary lymphadenopathy is seen. There is cardiomegaly. Pericardium are within normal limits. There is a left-sided  permanent pacemaker. The pacer wires are again noted in the region of the right atrium and right ventricle.  Coronary artery calcification: Mild  Soft tissues: Normal.  Abdomen: There is streak artifact however grossly unchanged and unremarkable unenhanced appearance. Probable left-sided parapelvic cysts sleeve gastrectomy.  Bones: Severe thoracolumbar scoliosis.   Impression:  1. Exacerbation in small airway disease with new tree-in-bud centrilobular micronodules within the right upper and right lower lobes.  2. New nodular pleural thickening over the medial basal segment of the right lower lobe.  3. In addition there is a new nodular opacity which may represent an area of rounded atelectasis versus a new nodule measuring 12.5 mm involving the posterior basal segment the right lower lobe.   Lung-RADS category:   4B - Suspicious findings for which additional diagnostic testing and/or  tissue sampling is recommended. Probability of malignancy > 15%.   Recommendation:  4B - chest CT with or without contrast, PET/CT and/or tissue sampling  depending on the probability of malignancy and comorbidities. PET/CT may be  used when there is a = 8 mm solid component.   Rajeev and 6MWT 18: FVC: 1.66 L (69%) --> 1.75 L (72%) FEV1: 1.18 L (60%) --> 1.17 L (60%) FEV1/FVC: 71% --> 67% JLG35-30%: 0.68 L/s (31%) --> 0.68 L/s (31%) 6MWT: 220 meters, SpO2 start: 94% --> SpO2 end: 94% Cannot r/o mild restrictive defect.  No significant expiratory airflow obstruction. No bronchodilator response. Reduced walk distance with no desaturation;  pt feels she may have been able to walk faster during the test.  Rajeev and DLCO 18: FVC: 1.44 L (51%) --> 1.74 L (62%) FEV1: 1.00 L (46%) --> 1.13 L (52%) FEV1/FVC: 70% --> 65% KHS05-28%: 0.60 L/s (28%) --> 0.48 L/s (22%) DLCO: 9.9 (41%) Severe obstruction with significant response to inhaled bronchodilator (in FVC). Moderatley decreased diffusion capacity.   [place] and DLCO, 6MWT 18: FVC: 1.97 L (70%) --> 2.03 L (73%) FEV1: 1.30 L (60%) --> 1.43 L (65%) FEV1/FVC: 66% --> 70% AYN27-19%: 0.63 L/s (29%) --> 0.80 L/s (37%) DLCO: 17.2 (72%) 6MWT: 288m, SpO2 start: 94%, SpO2 end: 95% Mildly reduced FVC. Mild obstructive defect. Mildly reduced diffusion capacity.  Reduced walk distance. No desaturation.  BAL 17: AFB culture: No AFB at 6 weeks Fungal culture: No fungus at 4 weeks C&S: Normal respiratory denise Fungitell: <45 Galactomannan: <0.500 Fluid segmented granulocytes: 94% Lymphocytes: 1% Monocytes: 4% Eosinophil: 1%  Spirometry 17: FVC: 1.67 L (60%) --> 1.63 L (58%) FEV1: 1.03 L (47%) --> 1.05 L (48%) FEV1/FVC: 62% --> 64% WFJ60-64%: 0.41 L/s (19%) --> 0.41 L/s (19%) Severe obstruction with no significant response to inhaled bronchodilator.   NIOX 17: 16 ppb Normal exhaled nitric oxide level.   EXAM:  CT NECK SOFT TISSUE, 2017 COMPARISON: CT angiogram of the neck 2013. FINDINGS: The aerodigestive tract is patent, but suboptimally assessed with intravenous contrast. There is no masslike asymmetry or fluid collection. At the level of the thoracic inlet just below the thyroid gland there is a slight contour deformity of the right posterior lateral tracheal wall. There is expected flattening and slight inward convexity of the posterior tracheal wall during this scan done at quiet respiration. Volume rendered 3D images are generated by the attending radiologist using a separate workstation.  There is no significant luminal compromise. The included lung apices appear grossly clear, however please see dedicated report from the contemporaneous chest CT for complete evaluation. A left chest wall cardiac pacer device noted. Review of the gu[place]upe stations demonstrates scattered subcentimeter nodes which do not meet imaging criteria for pathologic lymphadenopathy. Surgical clips identified from partial left superficial parotidectomy. The right parotid gland and both submandibular glands appear unremarkable on this noncontrast study. The osseous structures show cervical spondylosis at C4-5 level. The visualized intracranial and intraorbital compartments fail to demonstrate an acute abnormality. There is a large nasal septal defect. IMPRESSION: CT neck shows a patent airway. There is minimal contour deformity of the right posterolateral upper trachea. This is present on this exam done under quiet respiration. For more complete evaluation of the chest, please see dedicated report from the concurrent chest CT.  EXAM:  CT LDCT LUNG CA SCRN BASELINE *** ADDENDUM 2017  *** Addendum: 1. 4 mm noncalcified nodule within the lateral aspect of the apical segment of the right upper lobe (image 72) on further inspection was present on examination dated 10/17/2016, and 2016 and essentially unchanged allowing for differences in imaging technique. 2. Previously described 8 mm nodule within the posterior basal segment of the left lower lobe on multiplanar reformations has a more linear configuration consistent with a new focus of probable atelectasis as demonstrated in the posterior basal segment of the right lower lobe (image 259, and coronal image 125, series 07438, and sagittal image 17, series 35088). Revised Lung-Rads Category: 2  Recommendation: Continue annual screening with LDCT in 12 months. *** END OF ADDENDUM 2017  ***  PROCEDURE DATE:  2017 INTERPRETATION:  CT scan of the chest without intravenous contrast, using low-dose lung cancer screening protocol History: 44 year smoking history. Quit 6 years ago. Lung cancer screening. Comparison: CT chest from 10/17/2016. Findings: Lungs and airways: (Series 4) Minimal biapical paraseptal and mild upper lung zone centrilobular emphysema. There is increasing mild small airway inflammation characterized by scattered micronodules in a centrilobular tree-in-bud pattern (image 113, and 221) in the right upper, and right lower lobes respectively. There is increasing mild large airway inflammation characterized by bronchial wall thickening and bronchiectasis (images 94, 128 and 185). There are multiple new more discrete solid micronodules identified bilaterally thought to represent areas of mucous plugging largest in the apical segment of the right upper lobe measuring 4 mm (image 72) and in the posterior basal segment of the left lower lobe there is a new subpleural nodular and linear opacity measuring 8 mm (image 59) There are new linear opacities identified bilaterally in the posterior basal segment of the right lower lobe (image 245) as well as in the inferior medial aspect of the medial segment of the right middle lobe (image 214) thought to represent new areas of atelectasis Impression: 1.  Lung Rad category 4A:  New 7 mm subpleural nodule in posterior basal segment of left lower lobe with an adjacent linear component. This may represent a new area of atelectasis. Continue annual screening with low dose contrast CT in 12 months. 2.  Lung Rad category 3:  New 4 mm solid nodule within the apical segment of the right upper lobe. 3.  New mild small and large airway inflammation as described above. 4.  Minimal paraseptal and mild upper lung zone centrilobular emphysema. 5.  Mild left-sided coronary artery calcification.  Lung-RADS category: 3 - Probably benign finding - short term follow up suggested; includes nodules with a low likelihood of becoming a clinically active cancer. Probability of malignancy 1-2%.  4A - Suspicious findings for which additional diagnostic testing and/or tissue sampling is recommended. Probability of malignancy 5-15%.  Recommendation: 3 - 6 month LDCT. 4A - 3 month LDCT; PET/CT may be used when there is a = 8 mm solid component  NIOX 10/24/17: 10ppb Normal exhaled nitric oxide level.  Spirometry 10/24/17: FVC: 1.56 L (56%) FEV1: 1.04 L (48%) FEV1/FVC: 66% NZN70-53%: 0.55 L/s (26%) Moderate airway obstruction.  Cannot rule out restriction.  CXR 10/24/17: No obvious parenchymal lung opacities. No pleural effusion.  Maximal height of right hemidiaphragm located at 1/3 distance from lateral edge.   Spirometry and DLCO 17: FVC: 1.62L (57%) FEV1: 0.89L (40%) FEV1/FVC: 55% BTJ20-64%: 0.31L/s (14%) DLCO: 11.1 (48%) Moderate obstructive airways disease. Moderately reduced DLCO.   6MWT 17:  Distance: 288 m with walker, SpO2 start: 95%, SpO2 end: 88% Reduced walk distance with significant desaturation down to SpO2 88%.   Spirometry and DLCO 17: FVC: 1.55 L (55% pred) FEV1: 0.88 L (40% pred) FEV1/FVC: 57% YGX73-97%: 0.32 L/s (14% pred) DLCO: 11.7 (51% pred) Moderate obstructive airways disease. Moderately reduced DLCO.   PFT 3/28/17: FVC: 1.68 L (59% pred) --> 1.70 L (60% pred) FEV1: 1.08 L (49% pred) --> 1.11 L (50% pred) FEV1/FVC: 64% --> 65% SGU36-96%: 0.47 L/s (21% pred) --> 0.49 L/s (22% pred) TLC: 3.46 L (81% pred) RV/T% DLCO: 12.3 (54% pred) Mild obstruction.  Lung volumes normal.  Air trapping present.  Severely reduced DLCO.   17: FVC: 1.61 L (60% pred) FEV1: 1.03 (48% pred) FEV1/FVC: 64% MCK85-18%: 0.43 L/s (19% pred) Moderate obstructive defect, notably of small airways.  FVC and FEV1 reduced, cannot rule out restriction.    EXAM:  CT CHEST    10/17/2016 Evaluation of the chest demonstrates the visualized thyroid gland is normal in appearance. The heart is normal size. Coronary arterial calcification. No evidence of small airways disease. No evidence of  clustered tree-in-bud branching nodularity. There is a calcified nodule of the right lobe of the thyroid gland measuring 5 mm. No pleural or pericardial effusion. For thoracic inlet, axillary, mediastinal or hilar  adenopathy. Evaluation of the lung parenchyma is negative for pulmonary consolidation or mass. No endobronchial lesion. Minimal biapical centrilobular emphysematous change. Evaluation of the upper abdomen demonstrate a small hiatal hernia and post surgical changes from gastric bypass. Evaluation of the osseous structures is unremarkable. IMPRESSION: No suspicious pulmonary finding.No significant bronchiolitis.  9/15/16 Spirometry: FEV1 0.97L (45%), FVC 1.46L (54%), FEV1/FVC 66% NIOX = 6 CXR - Clear, no infiltrates  6 minute walk test performed 3/1/16: The patient covered a distance of 215 m (significantly improved from last time). The baseline SpO2 was 93%. At the end of exercise it was 91% which is not significantly different. Last while the distance was reduced to approximately 50% of predicted for this lady, there was no desaturation  3/1/16: The forced vital capacity was 1.38 L (48% predicted). The FEV1 was 0.91 L (41% predicted ). The FEV1/FVC was 66%. This shows a very significant reduction as compared to the previous tests.  6MWT 16: Distance: 172m, SpO2 start: 92.7%, SpO2 end: 94% Significantly reduced walk distance.  No significant change in SpO2 with exercise.  PFT 17: FVC: 1.69 L (59% pred) <-- from 1.35 L (47% pred) in 10/15 FEV1: 1.22 L (55% pred) <-- from 0.94 L (42% pred) in 10/15 FEV1/FVC: 72%<-- from 70% in 10/15 NRU75-06%: 0.81 L (36% pred) <-- from 0.61 L/s (27% pred) in 10/15 TLC: 3.40 L (79% pred) DLCO: 14.1 (60% pred) Mixed defect.  Mild improvement in flow after bronchodilator but not c/w positive response. Moderate diffusion capacity decrease.  CT of the CHEST  DATE:  2015 LUNGS: No pleural effusions are seen. There are multiple areas of centrilobular nodules with a branching configuration compatible with a productive bronchiolitis. More linear opacities are noted which may represent areas of mucous plugging within the lateral segment of the right middle lobe as well as the lateral basal segment of the right lower lobe. There is bronchial wall thickening to suggest large airway inflammation. MEDIASTINUM: The heart is normal in size.  No pericardial effusion is seen. Pulmonary arteries and thoracic aorta within normal limits. There is mild to moderate calcification of the left coronary artery. Heart size is within normal limits. No pericardial effusion. No axillary or supraclavicular lymphadenopathy. UPPER ABDOMEN, SOFT TISSUES AND BONES: Limited evaluation of the upper abdomen demonstrates evidence of chain sutures along the greater curvature of the stomach consistent with gastric sleeve procedure. Evaluation of the osseous structures demonstrates degenerative changes. IMPRESSION: Evidence of moderate to severe small airway inflammation characterized by productive bronchiolitis with a lesser involvement of the large airways. This is most pronounced in the distribution of the right middle and right lower lobes. No significant air-trapping is identified. Interval follow-up imaging after therapeutic administration may be of value in approximately 3 months. No suspicious parenchymal mass, lobar consolidation or pleural collections.Consideration, in view of the patient's history of participation in lung cancer screening is suggested.  CPAP polysomnography report from 12 reviewed today -CPAP @ 6.0cm/H2O during sleep with 2L of O2 was suggested  ECHO on 10/12/15 reviewed today -normal right atrium, normal right ventricular size and function, normal tricuspid valve, minimal tricuspid regurgitation -estimated right atrial pressure = 8mm Hg -estimated right ventricular systolic pressure = 24 mm Hg  Spirometry 10/13/15 - c/w severe obstructive pattern with likely small airway involvment FVC: 1.35 (47%) FEV1: 0.94 (42%) FEV1/FVC: 70% ZFT31-97: 0.61 (27%)  NiOx 10/13/15 normal= 7  CXR 10/15/15= clear  PS AHI: 51/hr - Lowerst Sat: 75% Patient notes compliance with current CPAP at level 2 and O2 overnight at 2L NC

## 2025-07-22 NOTE — ASSESSMENT
[FreeTextEntry1] : 7-22-25:  It was a pleasure to see Karyna during our follow-up visit today. Her respiratory issues are summarized:  1. Asthma/COPD overlap (ACO) Over 40 years of age, demonstrates moderate-severe airflow obstruction with FEV1/FVC of 50% of predicted. She has had asthma since 5 years old. With inhaled bronchodilator there is partial reversal of airflow obstruction 0.86 L -> 1.05 L, 41% -> 51%. She has been exposed to cigarette smoke. She has a 44 PY history of smoking. We have  maintained on Singulair, Incruse,  Brovana and budesonide. She continues to take her rescue inhaler sporadically. We will continue to monitor FEV1 on an annual basis to ensure no downward trend. She has abstained from cigarettes for over 10 years. She continues to use marijuana.   PFTs last(1/28/25) shows mild obstructive lung defect.  Over the past year, there is significant improvement in small airways, [id number]%, 0.20 -> 0.47 There is less air trapping, RV/TLC 64% -> 46% DLCO is 10.55 (54% of predicted). This is stable. Her walk distance improved by > 40 meters and saturation is significantly better. She walked 310 meters, SpO2  -> 95%.  She has lost over approximately 130 pounds, which is the most likely reason for improvement in PFTs and 6MWT  Recently, there have been several studies done showing that people who have an allergic asthma component of their asthma-COPD syndrome seem to have better quality of life and less exacerbations if they are put on biologic. ALENO study: using mepolizumab in a dose of 300mg subq every 4 weeks showed significant improvement in aforementioned endpoints. Two other studies FLAKOUS and NOTUS using dupilumab also showed improvements. We will perform testing for TH2 Asthma phenotype to determine if she is a suitable candidate.   Plan: - we will increase her Symbicort to 160 2 puffs BID, in addition to her umeclidinium with PRN albuterol  - CT chest in  for nodule f/u will also be will expiratory images to r/o TBM - not a candidate for Dupixent based on latest lab work  2. Marijuana smoking We have gone over with Karyna the objective evidence of adverse effects of marijuana 1 higher mortality according to [place] study (MVA, CV, pulmonary Side Effects) 2, cough, wheeze and dyspnea 3. exacerbation of asthma 4. inconsistent results but possible cancer. Cancers: squamous cell of head and neck. In men testicular cancer 5. cardiac toxicity (ischemia, MI, arrhythmias) 6. Strokes 7. Cognitive dysfunction.   She continues to smoke marijuana (non-prescription). We strongly recommended alternatives to marijuana smoking such as edible replacement  3. Pulmonary nodules, 1 new nodule There were micro nodules seen on 8/23/21. Chest CT scan done 8/17/2022 demonstrates very tiny micro nodules none of which are new. Her latest CT in 4/2025 showed a new rounded 4mm solid nodule in the RUL, not elliptical in shape so unlikely to be lymph node, not in the caty-fissural or sub-pleural  Plan: Repeat CT chest in 3 months; if no change, we will follow every 6 months for the first 2 years   4. ELLA hold off on repeat HST as she has lost signiicant weight  5. PH  It is unlikely that she has pulmonary hypertension. On her last echo 2/2024 there was insufficient TR to calculate PASP  6. Chronic pain  Karyna's main physical complaint is her chronic pain. She has a referral to see a new pain specialist and also plans to start seeing a psychotherapist. She is not a candidate for surgery. She uses gabapentin.   Plan: - She should continue to follow closely with pain management specialist.   7. Lower extremity edema with elevated d-dimer Karyna has R > L lower extremity edema with venous stasis changes of the right leg. Her d-dimer was elevated to 364 on 8/16. Bilateral lower extremity venous doppler to r/o DVT was negative on 8/24  Return to clinic in 6 months.

## 2025-07-22 NOTE — HISTORY OF PRESENT ILLNESS
[Former] : former [TextBox_4] : 71 yo F PMH sciatica with 2 herniated discs, 3 brain aneurysms, minimally mobile has a scooter but tries to walk more, presents for F/U of ACOS, active marijuana use, GERD, part of the lung cancer screening program, noncaseating granulomas found on subcarinal lymph node without evidence of MTB and CVD serologies negative. Continues on Brovana BID, budesonide BID, Incruse, and Singulair. DLCO noted to have decreased significantly, increased budesonide to BID ************  7-22-25: Breathing has been harder with humidity Using symbicort and inruse; was instructed to using symbicort BID but still using QD; using her albuterol 3-4x per week, she says increasing since the winter because of the heat Still using marijuana daily Had CT in 4/2025 which showed new 4mm RUL nodule suspicious for plugging Coughs copious phlegm daily  Lost 10 pounds since [place] Doesn't walk much over the past few months because her sciatica has been uncontrolled Has labs in April - Eos 170, IgE 118 in January Has appt with pain medicine in August Uses 2L O2 every night before bed  1-28-25 She has lost 130 pounds, 278 to 146 pounds she was able to walk 30 blocks the other day she used to have to stop after 3-4 blocks  7-23-24: She was given steroids by primary care doctor 2 weeks ago, about 5 days duration, due to wheezing although she says she felt well from a breathing standpoint wiht no increased phlegm or dyspnea. Says her breathing has been "pretty good". Having daily cough, occasionally with white phlegm. Still losing weight, now 155 (previously 278lbs). Using incruse elipta daily, denies using brovana. Continues to use marijuana daily.   2-20-24: Patient stated that she was placed on prednisone by her new PCP about a week due to wheezing and productive cough. Patient states that her productive cough is chronic and so is her wheezing. She is not sure if the medications were really indicated and if she is improving. Sedentary most of the day. Mentions significant weight loss. Lost 70lbs with ozempic and decreased appetite. Stopped smoking cigarettes 10 years ago. Still smokes marijuana for her joint pain.  5-23-23: She lost 35 pounds, on ozempic She is complaining of worsening asthma symptoms  11-22-22: She is short of breath, worse after she takes a bath She reports asbestos and fungus were found in her bathroom. She is requiring nebulizers 2-3 times a day Phlegm is green She had a stress test on 8/31. When she finished the stress test, she was experiencing chest pain and went to the ER. She was diagnosed with Takotsubo cardiomyopathy. [TextBox_11] : 1 [TextBox_13] : 44 [YearQuit] : 2011

## 2025-07-22 NOTE — PROCEDURE
[Thoracic Ultrasound] : Thoracic Ultrasound [A line] : A line: Yes [Lung sliding] : Lung sliding: Yes [Pleural Effusion] : Pleural Effusion: No [Consolidation] : Consolidation: No [de-identified] : abnormal physical exam finding [FreeTextEntry2] : No signs of pulmonary edema or pleural effusions. Normal lung POCUS examination. [FreeTextEntry1] : Lung cancer screening CT 25 Impression: 1. New 4 mm solid nodule within the posterior segment of the right upper lobe which may represent a small focus of mucous plugging. 2. Redemonstrated are additional stable solid micronodules. Lung-RADS category: 3 - Probably Benign based on imaging features or indolent behavior. Recommendation: 3 - 6-month LDCT.  PFT 25 FVC: 1.83 L (71%)--> 1.86 L (72%)  FEV1: 1.14 L (57%)--> 1.14 L (57%)  FEV1/FVC: 62%--> 62% HLL57-02%: 0.47 L/s (26%)--> 0.40 L/s (22%) TLC: 3.38 L (76%) RV/T% DLCO: 10.55 (54%) NIOX 6 ppb  6MWT 25: 310 meters, SpO2 97% -> 95%  PFT 24  FVC: 1.56 L (65%)--> 1.79 L (75%)   FEV1: 0.87 L (46%)--> 1.03 L (55%)   FEV1/FVC: 56%--> 58% HLV15-05%: 0.20 L/s (12%)--> 0.25 L/s (15%) T.73 L (113%) RV/T% DLCO: 14.87 (91%)  6MWT 24: 257 METERS, SPo2 94% -> 90%  Patient had an annual Low Dose CT for Lung Cancer screening on 23 Results of scan: Impression: Since 2022, there are again a few micronodules bilaterally. Lung-RADS category: 2  22 LDCT CHEST Report pending Images reviewed by Dr. Mcmullen personally Mild dilatation of airways with thickening of the walls discoid atelectasis on lingular segment significant Coronary Artery calcification No visible nodules. Previous nodules in RUL not seen No lung parenchymal abnormalities ***** 22 ECHO Normal LV and RV size and funtion No significant valvular disease insufficient TR to calculate PASP ***** PFT 22 FVC: 1.48 L (54%)--> 2.17 L (81%)   FEV1: 0.86 L (41%)--> 1.05 L (51%)   FEV1/FVC: 59%--> 48% XPX39-76%: 0.29 L/s (15%)--> 0.25 L/s (13%) TLC: 3.61 L (81%) RV/T% DLCO: 9.97 (50%)  EXAM: CT LDCT LUNG CA SCRN ANNUAL PROCEDURE DATE: 2020  Lungs and airways: Image numbers for description of nodule location refer to thin section axial series number 4.  The previously seen nodular opacity in the right lower lobe has resolved, indicating that it was probably caused by atelectasis. There is a small amount of dependent atelectasis in the left lower lobe. No change in a few bilateral micronodules, such as in the apical segment of the right upper lobe on image 63, in the anterior segment of the right upper lobe on image 94, and in the superior segment of the left lower lobe on image 156. A benign-appearing calcified nodule nodule in the anterobasal segment of the right lower lobe on image 176 unchanged. There is again mild centrilobular emphysema. Bronchial wall thickening again present bilaterally.  Pleura: The pleural spaces are clear.  Base of neck, mediastinum and heart: No change 0.6 cm calcified nodule right lobe of thyroid. No mediastinal, hilar or axillary lymphadenopathy is seen.  The heart and pericardium are within normal limits.  Vessels/Coronary artery calcification: Small calcified plaque aorta. Moderate coronary artery calcification.  Soft tissues: Left chest wall implant has pacemaker leads within right atrium and right ventricle.  Abdomen: Small hiatal hernia. Again post sleeve gastrectomy.  Bones: Degenerative changes of spine.  Impression: Since 3/5/2019, there has been no change in a few micronodules bilaterally. Probable atelectasis in the right lower lobe has resolved.  Lung-RADS category: 2 - Benign appearance or behavior. Nodules with very low likelihood of becoming a clinically active cancer due to size or lack of growth. Probability of malignancy < 1%.  Recommendation: Continue annual screening with LDCT in 12 months.   PFT and 6MWT 19: FVC: 1.54 L (59%) --> 1.62 L (62%) FEV1: 0.94 L (45%) --> 1.06 L/s (51%) FEV1/FVC: 61% --> 66% LDL63-88%: 0.40 L/s (18%) --> 0.51 L/s (23%) TLC: 3.45 L (81%) RV/T% DLCO: 15.3 (65%) 6MWT: 366 meters, SpO2 start (on RA): 94% --> SpO2 end (on RA): 94% Moderate obstructive lung defect. FEV1 increased by 13% post-inhaled bronchodilator. ETN34-46% increased by 27% post-inhaled bronchodilator. MIld restrictive lung defect. Mildly decreased diffusion capacity. Improved walk distance with no desaturation.   Spirometry and NIOX 19: pt declined  EXAM: CT LDCT LUNG CA SHORT TERM F U PROCEDURE DATE: 2019  Lungs and airways: Image numbers for description of nodule location refer to thin section axial series number 4.  The nodular opacity in the posterior basal segment of the right lower lobe on image 265 has decreased in size from 19 x 16 mm (13 mm average) to 13 x 9 mm (11 mm average). No change solid micronodule right apex, image 106. No new lung nodule.  Pleura: The pleural spaces are clear.  Base of neck, mediastinum and heart: The thyroid gland is normal. No mediastinal, hilar or axillary lymphadenopathy is seen. The heart is enlarged. No pericardial effusion. Small calcified plaque aorta.  Coronary artery calcification: Moderate.  Soft tissues: There is a left chest wall implant with pacemaker leads in the right atrium and right ventricle.  Abdomen: Again post sleeve gastrectomy. Small hiatal hernia.  Bones: Degenerative changes of spine.  Impression: Since 2018, the nodular opacity in the right lower lobe has decreased in size. It could be an area of atelectasis.  Lung-RADS category: 2 - Benign appearance or behavior. Nodules with very low likelihood of becoming a clinically active cancer due to size or lack of growth. Probability of malignancy < 1%.  Recommendation: Continue annual screening with LDCT in 12 months.   PFT 19: Pt declined PFT testing today.  EXAM: CT LDCT LUNG CA SCRN ANNUAL PROCEDURE DATE: 2018  INTERPRETATION: CT scan of the chest without intravenous contrast, using low-dose lung cancer screening protocol  History: 64-year-old former smoker with a 44 pack year history of smoking.  Comparison: 2017.  Findings:  Lungs and airways: Image numbers for description of nodule location refer to thin section axial series number 4. There is new focus of tree-in-bud micronodular opacities seen within the posterior segment of the right upper lobe (series 4, image 111). There has been interval resolution of linear atelectasis within the medial basal segment of the right middle lobe. Interval resolution of linear atelectasis within the right lower lobe. New tree-in-bud micronodular opacities are seen within the posterior basal segment of the right lower lobe (series 4, image 187 Linear atelectasis over the medial basal segment of the right lower lobe (image 4, 189.   NODULE 1:  Location: Posterior basal segment of the right lower lobe, Image #(axial image 209, sagittal image 64, and coronal image 51)  Size: 12.5 mm  Contains Fat: No  Calcification: None  Morphology: Round  Margins: Smooth  Change from prior: New   Trachea and central bronchi patent.  Pleura: New probable nodular pleural thickening versus atelectasis as described above involving the medial basal segments of right lower lobe (axial image 189 and sagittal image 77).  Base of neck, mediastinum and heart: The thyroid gland is normal. No mediastinal, hilar or axillary lymphadenopathy is seen. There is cardiomegaly. Pericardium are within normal limits. There is a left-sided  permanent pacemaker. The pacer wires are again noted in the region of the right atrium and right ventricle.  Coronary artery calcification: Mild  Soft tissues: Normal.  Abdomen: There is streak artifact however grossly unchanged and unremarkable unenhanced appearance. Probable left-sided parapelvic cysts sleeve gastrectomy.  Bones: Severe thoracolumbar scoliosis.   Impression:  1. Exacerbation in small airway disease with new tree-in-bud centrilobular micronodules within the right upper and right lower lobes.  2. New nodular pleural thickening over the medial basal segment of the right lower lobe.  3. In addition there is a new nodular opacity which may represent an area of rounded atelectasis versus a new nodule measuring 12.5 mm involving the posterior basal segment the right lower lobe.   Lung-RADS category:   4B - Suspicious findings for which additional diagnostic testing and/or  tissue sampling is recommended. Probability of malignancy > 15%.   Recommendation:  4B - chest CT with or without contrast, PET/CT and/or tissue sampling  depending on the probability of malignancy and comorbidities. PET/CT may be  used when there is a = 8 mm solid component.   Rajeev and 6MWT 18: FVC: 1.66 L (69%) --> 1.75 L (72%) FEV1: 1.18 L (60%) --> 1.17 L (60%) FEV1/FVC: 71% --> 67% AZF96-45%: 0.68 L/s (31%) --> 0.68 L/s (31%) 6MWT: 220 meters, SpO2 start: 94% --> SpO2 end: 94% Cannot r/o mild restrictive defect.  No significant expiratory airflow obstruction. No bronchodilator response. Reduced walk distance with no desaturation;  pt feels she may have been able to walk faster during the test.  Rajeev and DLCO 18: FVC: 1.44 L (51%) --> 1.74 L (62%) FEV1: 1.00 L (46%) --> 1.13 L (52%) FEV1/FVC: 70% --> 65% MHV51-23%: 0.60 L/s (28%) --> 0.48 L/s (22%) DLCO: 9.9 (41%) Severe obstruction with significant response to inhaled bronchodilator (in FVC). Moderatley decreased diffusion capacity.   [place] and DLCO, 6MWT 18: FVC: 1.97 L (70%) --> 2.03 L (73%) FEV1: 1.30 L (60%) --> 1.43 L (65%) FEV1/FVC: 66% --> 70% IAE70-02%: 0.63 L/s (29%) --> 0.80 L/s (37%) DLCO: 17.2 (72%) 6MWT: 288m, SpO2 start: 94%, SpO2 end: 95% Mildly reduced FVC. Mild obstructive defect. Mildly reduced diffusion capacity.  Reduced walk distance. No desaturation.  BAL 17: AFB culture: No AFB at 6 weeks Fungal culture: No fungus at 4 weeks C&S: Normal respiratory denise Fungitell: <45 Galactomannan: <0.500 Fluid segmented granulocytes: 94% Lymphocytes: 1% Monocytes: 4% Eosinophil: 1%  Spirometry 17: FVC: 1.67 L (60%) --> 1.63 L (58%) FEV1: 1.03 L (47%) --> 1.05 L (48%) FEV1/FVC: 62% --> 64% RQL40-29%: 0.41 L/s (19%) --> 0.41 L/s (19%) Severe obstruction with no significant response to inhaled bronchodilator.   NIOX 17: 16 ppb Normal exhaled nitric oxide level.   EXAM:  CT NECK SOFT TISSUE, 2017 COMPARISON: CT angiogram of the neck 2013. FINDINGS: The aerodigestive tract is patent, but suboptimally assessed with intravenous contrast. There is no masslike asymmetry or fluid collection. At the level of the thoracic inlet just below the thyroid gland there is a slight contour deformity of the right posterior lateral tracheal wall. There is expected flattening and slight inward convexity of the posterior tracheal wall during this scan done at quiet respiration. Volume rendered 3D images are generated by the attending radiologist using a separate workstation.  There is no significant luminal compromise. The included lung apices appear grossly clear, however please see dedicated report from the contemporaneous chest CT for complete evaluation. A left chest wall cardiac pacer device noted. Review of the guadalupe stations demonstrates scattered subcentimeter nodes which do not meet imaging criteria for pathologic lymphadenopathy. Surgical clips identified from partial left superficial parotidectomy. The right parotid gland and both submandibular glands appear unremarkable on this noncontrast study. The osseous structures show cervical spondylosis at C4-5 level. The visualized intracranial and intraorbital compartments fail to demonstrate an acute abnormality. There is a large nasal septal defect. IMPRESSION: CT neck shows a patent airway. There is minimal contour deformity of the right posterolateral upper trachea. This is present on this exam done under quiet respiration. For more complete evaluation of the chest, please see dedicated report from the concurrent chest CT.  EXAM:  CT LDCT LUNG CA SCRN BASELINE *** ADDENDUM 2017  *** Addendum: 1. 4 mm noncalcified nodule within the lateral aspect of the apical segment of the right upper lobe (image 72) on further inspection was present on examination dated 10/17/2016, and 2016 and essentially unchanged allowing for differences in imaging technique. 2. Previously described 8 mm nodule within the posterior basal segment of the left lower lobe on multiplanar reformations has a more linear configuration consistent with a new focus of probable atelectasis as demonstrated in the posterior basal segment of the right lower lobe (image 259, and coronal image 125, series 60373, and sagittal image 17, series 30482). Revised Lung-Rads Category: 2  Recommendation: Continue annual screening with LDCT in 12 months. *** END OF ADDENDUM 2017  ***  PROCEDURE DATE:  2017 INTERPRETATION:  CT scan of the chest without intravenous contrast, using low-dose lung cancer screening protocol History: 44 year smoking history. Quit 6 years ago. Lung cancer screening. Comparison: CT chest from 10/17/2016. Findings: Lungs and airways: (Series 4) Minimal biapical paraseptal and mild upper lung zone centrilobular emphysema. There is increasing mild small airway inflammation characterized by scattered micronodules in a centrilobular tree-in-bud pattern (image 113, and 221) in the right upper, and right lower lobes respectively. There is increasing mild large airway inflammation characterized by bronchial wall thickening and bronchiectasis (images 94, 128 and 185). There are multiple new more discrete solid micronodules identified bilaterally thought to represent areas of mucous plugging largest in the apical segment of the right upper lobe measuring 4 mm (image 72) and in the posterior basal segment of the left lower lobe there is a new subpleural nodular and linear opacity measuring 8 mm (image 59) There are new linear opacities identified bilaterally in the posterior basal segment of the right lower lobe (image 245) as well as in the inferior medial aspect of the medial segment of the right middle lobe (image 214) thought to represent new areas of atelectasis Impression: 1.  Lung Rad category 4A:  New 7 mm subpleural nodule in posterior basal segment of left lower lobe with an adjacent linear component. This may represent a new area of atelectasis. Continue annual screening with low dose contrast CT in 12 months. 2.  Lung Rad category 3:  New 4 mm solid nodule within the apical segment of the right upper lobe. 3.  New mild small and large airway inflammation as described above. 4.  Minimal paraseptal and mild upper lung zone centrilobular emphysema. 5.  Mild left-sided coronary artery calcification.  Lung-RADS category: 3 - Probably benign finding - short term follow up suggested; includes nodules with a low likelihood of becoming a clinically active cancer. Probability of malignancy 1-2%.  4A - Suspicious findings for which additional diagnostic testing and/or tissue sampling is recommended. Probability of malignancy 5-15%.  Recommendation: 3 - 6 month LDCT. 4A - 3 month LDCT; PET/CT may be used when there is a = 8 mm solid component  NIOX 10/24/17: 10ppb Normal exhaled nitric oxide level.  Spirometry 10/24/17: FVC: 1.56 L (56%) FEV1: 1.04 L (48%) FEV1/FVC: 66% VTL03-28%: 0.55 L/s (26%) Moderate airway obstruction.  Cannot rule out restriction.  CXR 10/24/17: No obvious parenchymal lung opacities. No pleural effusion.  Maximal height of right hemidiaphragm located at 1/3 distance from lateral edge.   Spirometry and DLCO 17: FVC: 1.62L (57%) FEV1: 0.89L (40%) FEV1/FVC: 55% GJB63-24%: 0.31L/s (14%) DLCO: 11.1 (48%) Moderate obstructive airways disease. Moderately reduced DLCO.   6MWT 17:  Distance: 288 m with walker, SpO2 start: 95%, SpO2 end: 88% Reduced walk distance with significant desaturation down to SpO2 88%.   Spirometry and DLCO 17: FVC: 1.55 L (55% pred) FEV1: 0.88 L (40% pred) FEV1/FVC: 57% KNX85-41%: 0.32 L/s (14% pred) DLCO: 11.7 (51% pred) Moderate obstructive airways disease. Moderately reduced DLCO.   PFT 3/28/17: FVC: 1.68 L (59% pred) --> 1.70 L (60% pred) FEV1: 1.08 L (49% pred) --> 1.11 L (50% pred) FEV1/FVC: 64% --> 65% MRA48-34%: 0.47 L/s (21% pred) --> 0.49 L/s (22% pred) TLC: 3.46 L (81% pred) RV/T% DLCO: 12.3 (54% pred) Mild obstruction.  Lung volumes normal.  Air trapping present.  Severely reduced DLCO.   17: FVC: 1.61 L (60% pred) FEV1: 1.03 (48% pred) FEV1/FVC: 64% AIT33-29%: 0.43 L/s (19% pred) Moderate obstructive defect, notably of small airways.  FVC and FEV1 reduced, cannot rule out restriction.    EXAM:  CT CHEST    10/17/2016 Evaluation of the chest demonstrates the visualized thyroid gland is normal in appearance. The heart is normal size. Coronary arterial calcification. No evidence of small airways disease. No evidence of  clustered tree-in-bud branching nodularity. There is a calcified nodule of the right lobe of the thyroid gland measuring 5 mm. No pleural or pericardial effusion. For thoracic inlet, axillary, mediastinal or hilar  adenopathy. Evaluation of the lung parenchyma is negative for pulmonary consolidation or mass. No endobronchial lesion. Minimal biapical centrilobular emphysematous change. Evaluation of the upper abdomen demonstrate a small hiatal hernia and post surgical changes from gastric bypass. Evaluation of the osseous structures is unremarkable. IMPRESSION: No suspicious pulmonary finding.No significant bronchiolitis.  9/15/16 Spirometry: FEV1 0.97L (45%), FVC 1.46L (54%), FEV1/FVC 66% NIOX = 6 CXR - Clear, no infiltrates  6 minute walk test performed 3/1/16: The patient covered a distance of 215 m (significantly improved from last time). The baseline SpO2 was 93%. At the end of exercise it was 91% which is not significantly different. Last while the distance was reduced to approximately 50% of predicted for this lady, there was no desaturation  3/1/16: The forced vital capacity was 1.38 L (48% predicted). The FEV1 was 0.91 L (41% predicted ). The FEV1/FVC was 66%. This shows a very significant reduction as compared to the previous tests.  6MWT 16: Distance: 172m, SpO2 start: 92.7%, SpO2 end: 94% Significantly reduced walk distance.  No significant change in SpO2 with exercise.  PFT 17: FVC: 1.69 L (59% pred) <-- from 1.35 L (47% pred) in 10/15 FEV1: 1.22 L (55% pred) <-- from 0.94 L (42% pred) in 10/15 FEV1/FVC: 72%<-- from 70% in 10/15 FEX07-82%: 0.81 L (36% pred) <-- from 0.61 L/s (27% pred) in 10/15 TLC: 3.40 L (79% pred) DLCO: 14.1 (60% pred) Mixed defect.  Mild improvement in flow after bronchodilator but not c/w positive response. Moderate diffusion capacity decrease.  CT of the CHEST  DATE:  2015 LUNGS: No pleural effusions are seen. There are multiple areas of centrilobular nodules with a branching configuration compatible with a productive bronchiolitis. More linear opacities are noted which may represent areas of mucous plugging within the lateral segment of the right middle lobe as well as the lateral basal segment of the right lower lobe. There is bronchial wall thickening to suggest large airway inflammation. MEDIASTINUM: The heart is normal in size.  No pericardial effusion is seen. Pulmonary arteries and thoracic aorta within normal limits. There is mild to moderate calcification of the left coronary artery. Heart size is within normal limits. No pericardial effusion. No axillary or supraclavicular lymphadenopathy. UPPER ABDOMEN, SOFT TISSUES AND BONES: Limited evaluation of the upper abdomen demonstrates evidence of chain sutures along the greater curvature of the stomach consistent with gastric sleeve procedure. Evaluation of the osseous structures demonstrates degenerative changes. IMPRESSION: Evidence of moderate to severe small airway inflammation characterized by productive bronchiolitis with a lesser involvement of the large airways. This is most pronounced in the distribution of the right middle and right lower lobes. No significant air-trapping is identified. Interval follow-up imaging after therapeutic administration may be of value in approximately 3 months. No suspicious parenchymal mass, lobar consolidation or pleural collections.Consideration, in view of the patient's history of participation in lung cancer screening is suggested.  CPAP polysomnography report from 12 reviewed today -CPAP @ 6.0cm/H2O during sleep with 2L of O2 was suggested  ECHO on 10/12/15 reviewed today -normal right atrium, normal right ventricular size and function, normal tricuspid valve, minimal tricuspid regurgitation -estimated right atrial pressure = 8mm Hg -estimated right ventricular systolic pressure = 24 mm Hg  Spirometry 10/13/15 - c/w severe obstructive pattern with likely small airway involvment FVC: 1.35 (47%) FEV1: 0.94 (42%) FEV1/FVC: 70% [id number]: 0.61 (27%)  NiOx 10/13/15 normal= 7  CXR 10/15/15= clear  PS AHI: 51/hr - Lowerst Sat: 75% Patient notes compliance with current CPAP at level 2 and O2 overnight at 2L NC

## 2025-07-24 NOTE — REVIEW OF SYSTEMS
[Fever] : no fever [Headache] : no headache [Weight Gain (___ Lbs)] : no recent weight gain [Chills] : no chills [Weight Loss (___ Lbs)] : no recent weight loss [SOB] : no shortness of breath [Dyspnea on exertion] : not dyspnea during exertion [Chest Discomfort] : no chest discomfort [Palpitations] : no palpitations [Syncope] : no syncope [Cough] : no cough [Wheezing] : no wheezing [Negative] : Heme/Lymph [FreeTextEntry9] : chronic back pain and sciatica

## 2025-07-24 NOTE — ADDENDUM
[FreeTextEntry1] : I, Pema Peters, am scribing for and the presence of Dr. gN the following sections: HPI, PMH, Family/social history, ROS, Physical Exam, Assessment / Plan.   I, Raj Ng, personally performed the services described in the documentation, reviewed the documentation recorded by the scribe in my presence and it accurately and completely records my words and actions.

## 2025-07-24 NOTE — HISTORY OF PRESENT ILLNESS
[SOB] : no dyspnea [Syncope] : no syncope [Dizziness] : no dizziness [Chest Pain] : no chest pain or discomfort [Shoulder Pain] : no shoulder pain [Pain at Site] : no pain at device site [Erythema at Site] : no erythema at device site [Swelling at Site] : no swelling at device site [FreeTextEntry1] : Ms. Keenan is a 70 year old obese female with a past medical history significant for CVA, CAD, HTN, asthma/COPD, ELLA (compliant with CPAP), mild to moderate pHTN, Diverticulosis, three brain aneurysms and reported junctional rhythm @ 45 bpm during recovery after exercise stress test 1/2016.  She was noted to have sinus bradycardia noted during cardiac catheterization in 3/2016 after report of CP & SOB.  Cath significant for non-obstructive CAD and mild/moderate pHTN.  She wore a holter monitor 6/2015 with HR  with normal chronotropic response and PAC/APC.  EKGs at Dr. Sousa's office significant for bradycardia (~ 40 bpm) while awake.   She is s/p PPM placement 2/23/17.  She presents for routine follow-up today and offers no device related complaints. No c/o CP, palpitations, dizziness, presyncope/syncope.  TTE 2/6/24: LVEF 61%, mild (grade I) LV diastolic dysfunction, mild TR, PASP 23 mmHg, normal bi-atrial sizes

## 2025-07-24 NOTE — ADDENDUM
[FreeTextEntry1] : I, Pema Peters, am scribing for and the presence of Dr. Ng the following sections: HPI, PMH, Family/social history, ROS, Physical Exam, Assessment / Plan.   I, Raj Ng, personally performed the services described in the documentation, reviewed the documentation recorded by the scribe in my presence and it accurately and completely records my words and actions.

## 2025-07-24 NOTE — DISCUSSION/SUMMARY
[Pacemaker Function Normal] : normal pacemaker function [Patient] : the patient [FreeTextEntry1] :  69 y/o F with sinus node dysfunction s/p PPM placement 2/2017.   1.  SSS S/P PPM -The device is functioning appropriately as programmed and all measured data is WNL.  She is not pacemaker dependent.  Underlying rhythm is sinus hannah with 1:1 AV conduction. -Brief episodes of AT/AF noted lasting seconds-minute, but no sustained events. No strong indication to start anticoagulation therapy. Will continue to closely monitor.   F/u in 6 months, sooner if warranted.

## 2025-07-24 NOTE — DISCUSSION/SUMMARY
[Pacemaker Function Normal] : normal pacemaker function [Patient] : the patient [FreeTextEntry1] :  71 y/o F with sinus node dysfunction s/p PPM placement 2/2017.   1.  SSS S/P PPM -The device is functioning appropriately as programmed and all measured data is WNL.  She is not pacemaker dependent.  Underlying rhythm is sinus hannah with 1:1 AV conduction. -Brief episodes of AT/AF noted lasting seconds-minute, but no sustained events. No strong indication to start anticoagulation therapy. Will continue to closely monitor.   F/u in 6 months, sooner if warranted.

## 2025-07-24 NOTE — PHYSICAL EXAM
[Left Infraclavicular] : left infraclavicular area [Clean] : clean [Dry] : dry [Well-Healed] : well-healed [General Appearance - Well Developed] : well developed [Normal Appearance] : normal appearance [General Appearance - Well Nourished] : well nourished [General Appearance - In No Acute Distress] : no acute distress [Heart Rate And Rhythm] : heart rate and rhythm were normal [Heart Sounds] : normal S1 and S2 [] : no respiratory distress [Respiration, Rhythm And Depth] : normal respiratory rhythm and effort [Palpable Crepitus] : no palpable crepitus [Bleeding] : no active bleeding [Foul Odor] : no foul smell [Purulent Drainage] : no purulent drainage [Serosanguineous Drainage] : no serosanquineous drainage [Serous Drainage] : no serous drainage [Erythema] : not erythematous [Warm] : not warm [Tender] : not tender [Indurated] : not indurated [Fluctuant] : not fluctuant

## 2025-07-24 NOTE — PROCEDURE
[No] : not [Pacemaker] : pacemaker [DDD] : DDD [Normal] : The battery status is normal. [de-identified] : [employer] [de-identified] : 2/23/17 [de-identified] :  [de-identified] : 3.0-3.6 years [de-identified] : AP 67%  <1% AT/AF episodes <1 minute each. No sustained events  See scanned report for further interrogation details 7/21/25

## 2025-07-24 NOTE — PROCEDURE
[No] : not [Pacemaker] : pacemaker [DDD] : DDD [Normal] : The battery status is normal. [de-identified] : [employer] [de-identified] : 2/23/17 [de-identified] :  [de-identified] : 3.0-3.6 years [de-identified] : AP 67%  <1% AT/AF episodes <1 minute each. No sustained events  See scanned report for further interrogation details 7/21/25